# Patient Record
Sex: MALE | Race: ASIAN | NOT HISPANIC OR LATINO
[De-identification: names, ages, dates, MRNs, and addresses within clinical notes are randomized per-mention and may not be internally consistent; named-entity substitution may affect disease eponyms.]

---

## 2024-01-03 PROBLEM — Z00.00 ENCOUNTER FOR PREVENTIVE HEALTH EXAMINATION: Status: ACTIVE | Noted: 2024-01-03

## 2024-01-04 ENCOUNTER — APPOINTMENT (OUTPATIENT)
Dept: THORACIC SURGERY | Facility: CLINIC | Age: 74
End: 2024-01-04
Payer: MEDICAID

## 2024-01-04 VITALS
HEART RATE: 72 BPM | RESPIRATION RATE: 17 BRPM | TEMPERATURE: 96.8 F | OXYGEN SATURATION: 96 % | WEIGHT: 160 LBS | BODY MASS INDEX: 25.11 KG/M2 | HEIGHT: 67 IN | DIASTOLIC BLOOD PRESSURE: 82 MMHG | SYSTOLIC BLOOD PRESSURE: 126 MMHG

## 2024-01-04 DIAGNOSIS — Z87.891 PERSONAL HISTORY OF NICOTINE DEPENDENCE: ICD-10-CM

## 2024-01-04 DIAGNOSIS — Z87.09 PERSONAL HISTORY OF OTHER DISEASES OF THE RESPIRATORY SYSTEM: ICD-10-CM

## 2024-01-04 DIAGNOSIS — Z86.79 PERSONAL HISTORY OF OTHER DISEASES OF THE CIRCULATORY SYSTEM: ICD-10-CM

## 2024-01-04 PROCEDURE — 99205 OFFICE O/P NEW HI 60 MIN: CPT

## 2024-01-05 PROBLEM — Z87.09 HISTORY OF ASTHMA: Status: RESOLVED | Noted: 2024-01-05 | Resolved: 2024-01-05

## 2024-01-05 PROBLEM — Z86.79 HISTORY OF HYPERTENSION: Status: RESOLVED | Noted: 2024-01-05 | Resolved: 2024-01-05

## 2024-01-05 PROBLEM — Z87.891 FORMER SMOKER: Status: ACTIVE | Noted: 2024-01-05

## 2024-01-05 RX ORDER — AMLODIPINE BESYLATE 5 MG/1
5 TABLET ORAL
Refills: 0 | Status: ACTIVE | COMMUNITY

## 2024-01-05 RX ORDER — LOSARTAN POTASSIUM 100 MG/1
TABLET, FILM COATED ORAL
Refills: 0 | Status: ACTIVE | COMMUNITY

## 2024-01-05 NOTE — HISTORY OF PRESENT ILLNESS
[FreeTextEntry1] : Mr. NIRU ESQUIVEL, 74 year old male, former smoker, w/ hx of HTN, asthma, who presented with persistent cough and mucus, found to have lung nodule and liver lesion.   CT Chest on 12/18/23: - 2.2 cm irregular opacity in RLL (4:12) - possible 3.7 cm low-attention lesion in Lt lobe of liver (4:27) - this is sclerosis in Lt inferior pubic ramus   Pt presents today for CT Sx consultation, referred by Dr. Elaina Bond. Pt reports had hx of lung biopsy in [place] in 1990s, it was benign findings. Pt reports asymptomatic, denies fever, chills, SOB, cough, hemoptysis, CP, pain or recent illness.

## 2024-01-05 NOTE — ASSESSMENT
[FreeTextEntry1] : Mr. NIRU ESQUIVEL, 74 year old male, former smoker, w/ hx of HTN, asthma, who presented with persistent cough and mucus, found to have lung nodule and liver lesion.   CT Chest on 12/18/23: - 2.2 cm irregular opacity in RLL (4:12) - possible 3.7 cm low-attention lesion in Lt lobe of liver (4:27) - this is sclerosis in Lt inferior pubic ramus   I have reviewed the patient's medical records and diagnostic images at time of this office consultation and have made the following recommendation: 1. CT reviewed with pt, lung nodule with liver lesion, suspicious for metastatic disease, will order PET/CT for further evaluation. PCP ordered CT Chest and MRI Abd, pending to be done.  2. PFTs  3. RTC after all above studies to discuss next step.    I, LALA Eubanks, personally performed the evaluation and management (E/M) services for this new patient.  That E/M includes conducting the initial examination, assessing all conditions, and establishing the plan of care.  Today, my ACP, Marisol Arriaga, JORDY-BC was here to observe my evaluation and management services for this patient to be followed going forward.

## 2024-01-05 NOTE — CONSULT LETTER
[Dear  ___] : Dear  [unfilled], [Consult Letter:] : I had the pleasure of evaluating your patient, [unfilled]. [Please see my note below.] : Please see my note below. [Consult Closing:] : Thank you very much for allowing me to participate in the care of this patient.  If you have any questions, please do not hesitate to contact me. [Sincerely,] : Sincerely, [FreeTextEntry2] : Elaina Bond MD (ref/PCP) [FreeTextEntry3] : Ruslan Vicente MD, MPH  System Director of Thoracic Surgery  Director of Comprehensive Lung and Foregut [place]  Professor Cardiovascular & Thoracic Surgery   [employer] [employer] at [facility]

## 2024-01-11 ENCOUNTER — APPOINTMENT (OUTPATIENT)
Dept: THORACIC SURGERY | Facility: CLINIC | Age: 74
End: 2024-01-11
Payer: MEDICAID

## 2024-01-11 PROCEDURE — 99443: CPT

## 2024-01-12 NOTE — REASON FOR VISIT
[Home] : at home, [unfilled] , at the time of the visit. [Medical Office: ([facility])___] : at the medical office located in  [Other:____] : [unfilled] [This encounter was initiated by telehealth (audio with video) and converted to telephone (audio only) due to technical difficulties.] : This encounter was initiated by telehealth (audio with video) and converted to telephone (audio only) due to technical difficulties. [Follow-Up: _____] : a [unfilled] follow-up visit

## 2024-01-22 NOTE — HISTORY OF PRESENT ILLNESS
Venipuncture collected.     [FreeTextEntry1] : Mr. NIRU ESQUIVEL, 74 year old male, former smoker, w/ hx of HTN, asthma, who presented with persistent cough and mucus, found to have lung nodule and liver lesion.   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   Pt today to follow up with telehealth.

## 2024-01-22 NOTE — ADDENDUM
[FreeTextEntry1] : 1/22/24: Case discussed with Dr. Valencia, PET scan is helpful to assess liver mass, but patient would benefit with a CT Angio of the abdomen to assess the Lt hepatic artery/aneurysm. Will order CT Angio as discussed.  I, Dr. CISNEROS, LALA BARNETT, personally performed the evaluation and management (E/M) services for this established patient who presents today with (a) new problem(s)/exacerbation of (an) existing condition(s). That E/M includes conducting the examination, assessing all new/exacerbated conditions, and establishing a new plan of care. Today, my ACP, Servando Engel NP was here to observe my evaluation and management services for this new problem/exacerbated condition to be followed going forward.

## 2024-01-22 NOTE — CONSULT LETTER
[Dear  ___] : Dear  [unfilled], [Consult Letter:] : I had the pleasure of evaluating your patient, [unfilled]. [Please see my note below.] : Please see my note below. [Consult Closing:] : Thank you very much for allowing me to participate in the care of this patient.  If you have any questions, please do not hesitate to contact me. [Sincerely,] : Sincerely, [FreeTextEntry2] : Elaina Bond MD (ref/PCP) [FreeTextEntry3] : Ruslan Vicente MD, MPH  System Director of Thoracic Surgery  Director of Comprehensive Lung and Foregut [place]  Professor Cardiovascular & Thoracic Surgery   [facility] [employer] at [facility]

## 2024-01-22 NOTE — ASSESSMENT
[FreeTextEntry1] : Mr. NIRU ESQUIVEL, 74 year old male, former smoker, w/ hx of HTN, asthma, who presented with persistent cough and mucus, found to have lung nodule and liver lesion.   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   I have reviewed the patient's medical records and diagnostic images at time of this office consultation and have made the following recommendation: 1. MRI liver reviewed, suspicious for metastatic disease. Pending PET, I would like to refer pt to IR Dr. Valencia for biopsy of liver lesion.  2. RTC after biopsy   I, LALA Eubanks, personally performed the evaluation and management (E/M) services for this established patient who presents today with (a) new problem(s)/exacerbation of (an) existing condition(s).  That E/M includes conducting the examination, assessing all new/exacerbated conditions, and establishing a new plan of care.  Today, my ACP, JORDY Banerjee-BC was here to observe my evaluation and management services for this new problem/exacerbated condition to be followed going forward.

## 2024-01-29 ENCOUNTER — OUTPATIENT (OUTPATIENT)
Dept: OUTPATIENT SERVICES | Facility: HOSPITAL | Age: 74
LOS: 1 days | End: 2024-01-29
Payer: MEDICAID

## 2024-01-29 ENCOUNTER — APPOINTMENT (OUTPATIENT)
Dept: CT IMAGING | Facility: IMAGING CENTER | Age: 74
End: 2024-01-29
Payer: MEDICAID

## 2024-01-29 DIAGNOSIS — K76.9 LIVER DISEASE, UNSPECIFIED: ICD-10-CM

## 2024-01-29 PROCEDURE — 74175 CTA ABDOMEN W/CONTRAST: CPT

## 2024-01-29 PROCEDURE — 74175 CTA ABDOMEN W/CONTRAST: CPT | Mod: 26

## 2024-02-13 ENCOUNTER — APPOINTMENT (OUTPATIENT)
Dept: INTERVENTIONAL RADIOLOGY/VASCULAR | Facility: CLINIC | Age: 74
End: 2024-02-13

## 2024-02-13 VITALS
BODY MASS INDEX: 25.11 KG/M2 | SYSTOLIC BLOOD PRESSURE: 130 MMHG | RESPIRATION RATE: 17 BRPM | HEIGHT: 67 IN | HEART RATE: 71 BPM | DIASTOLIC BLOOD PRESSURE: 80 MMHG | OXYGEN SATURATION: 96 % | WEIGHT: 160 LBS

## 2024-02-13 PROCEDURE — XXXXX: CPT | Mod: 1L

## 2024-02-13 NOTE — PHYSICAL EXAM
[Alert] : alert [No Respiratory Distress] : no respiratory distress [Normal Rate] : heart rate was normal  [Oriented x3] : oriented to person, place, and time [No Accessory Muscle Use] : no accessory muscle use [Clear to Auscultation] : lungs were clear to auscultation bilaterally [Normal S1, S2] : normal S1 and S2 [Not Tender] : non-tender [Soft] : abdomen soft [Not Distended] : not distended [de-identified] : b/l femoral pulses palpable and b/l DP's palpable as well.

## 2024-02-13 NOTE — REVIEW OF SYSTEMS
[Nosebleeds] : nosebleeds [Fever] : no fever [Chills] : no chills [Chest Pain] : no chest pain [Palpitations] : no palpitations [Shortness Of Breath] : no shortness of breath [Diarrhea] : no diarrhea [Easy Bleeding] : no tendency for easy bleeding [Easy Bruising] : no tendency for easy bruising

## 2024-02-13 NOTE — REASON FOR VISIT
[Consultation] : a consultation visit [Other: _____] : [unfilled] [Other: ______] : provided by PAT [Time Spent: ____ minutes] : Total time spent using  services: [unfilled] minutes. The patient's primary language is not English thus required  services. [FreeTextEntry1] : Hepatic angiogram [Interpreters_IDNumber] : [id number] [Interpreters_FullName] : Eleanor [TWNoteComboBox1] : Chinese

## 2024-02-13 NOTE — ASSESSMENT
[FreeTextEntry1] : 74-year-old male with a past medical history of former smoker, HTN, asthma who presented to [facility] with persistent cough and mucus and was found to have lung nodule, liver lesion and hepatic aneurysm.  Original consultation for liver mass biopsy based on unenhanced imaging however, upon further review of MRI and CTA, would additionally want to proceed with hepatic artery angiogram and possible embolization. He has now been referred to IR by Dr. Vicente for consultation regarding a heaptic angiogram.   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 Early enhancement of the left portal vein, raising concern for an arterioportal shunt. Question communication between a left portal venous branch and the left hepatic artery aneurysm, which may represent arterioportal fistula. Diffuse prominence of the celiac artery and hepatic arteries."  The full procedure of a visceral and hepatic angiogram was discussed with the patient and his family.  Angiogram would be performed with intention to treat the hepatic artery aneurysm with embolization. Risks, benefits, and alternatives were discussed.  Risk discussed included, but were not limited to, risk of bleeding, vascular injury, hepatic dysfunction. Ample time was provided to answer his and his family's questions.  Consent was obtained at the time of consultation. Prior to proceeding, labs will be obtained including CBC, CMP, and coags.  Will plan to present case at multidisciplinary conference.  Procedure tentatively planned to be performed with sedation in IR.    Plan: Visceral and hepatic artery angiogram.  Possible hepatic artery aneurysm embolization.

## 2024-02-13 NOTE — PLAN
[TextEntry] : *Patient was given pre op instructions at today's visit. *No eating after midnight the night before. Patient can have water up until two hrs before scheduled procedure. *No OTC Aspirin five days before procedure, no Ibuprofen, Advil , Aleve, Motrin for 24 hrs prior to procedure. *Patient was instructed to take her meds two hrs prior to procedure with water. *You must make arrangements prior to procedure for a family member/friend to drive you home after your procedure. You cannot take taxicab, Uber or public transportation unless your friend, family member is riding along with you in the taxicab, uber or public transportation. *Please bring your ID and insurance card with you on the day of procedure. *Please leave all jewelry and valuables at home on the day of procedure.

## 2024-02-13 NOTE — DATA REVIEWED
[FreeTextEntry1] : Ct angio reviewed with patient at length. All questions answered during consultation.

## 2024-02-13 NOTE — HISTORY OF PRESENT ILLNESS
[FreeTextEntry1] : Patient is a 74-year-old male with a past medical history of former smoker, HTN, asthma who presented to [facility] with persistent cough and mucus and was found to have lung nodule, liver lesion and hepatic aneurysm. He has now been referred to IR by Dr. Vicente for consultation regarding a heaptic angiogram.   Patient denies recent fever, chills, shortness of breath, chest pain, nausea, vomiting or diarrhea.   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 Early enhancement of the left portal vein, raising concern for an arterioportal shunt. Question communication between a left portal venous branch and the left hepatic artery aneurysm, which may represent arterioportal fistula. Diffuse prominence of the celiac artery and hepatic arteries."  Of note nosebleed last night lasted few min and then stopped. Patient states he has nosebleed almost every day.

## 2024-02-14 LAB — POTASSIUM SERPL-SCNC: 4.5 MMOL/L

## 2024-02-22 ENCOUNTER — NON-APPOINTMENT (OUTPATIENT)
Age: 74
End: 2024-02-22

## 2024-02-23 ENCOUNTER — OUTPATIENT (OUTPATIENT)
Dept: OUTPATIENT SERVICES | Facility: HOSPITAL | Age: 74
LOS: 1 days | End: 2024-02-23
Payer: MEDICAID

## 2024-02-23 VITALS
HEART RATE: 59 BPM | OXYGEN SATURATION: 96 % | HEIGHT: 66 IN | RESPIRATION RATE: 14 BRPM | WEIGHT: 162.04 LBS | DIASTOLIC BLOOD PRESSURE: 79 MMHG | SYSTOLIC BLOOD PRESSURE: 152 MMHG | TEMPERATURE: 98 F

## 2024-02-23 DIAGNOSIS — K76.9 LIVER DISEASE, UNSPECIFIED: ICD-10-CM

## 2024-02-23 DIAGNOSIS — Z98.890 OTHER SPECIFIED POSTPROCEDURAL STATES: Chronic | ICD-10-CM

## 2024-02-23 DIAGNOSIS — I72.8 ANEURYSM OF OTHER SPECIFIED ARTERIES: ICD-10-CM

## 2024-02-23 LAB
APTT BLD: 38.4 SEC — HIGH (ref 24.5–35.6)
INR BLD: 1.04 RATIO — SIGNIFICANT CHANGE UP (ref 0.85–1.18)
PROTHROM AB SERPL-ACNC: 11.7 SEC — SIGNIFICANT CHANGE UP (ref 9.5–13)

## 2024-02-23 PROCEDURE — 93010 ELECTROCARDIOGRAM REPORT: CPT

## 2024-02-23 NOTE — H&P PST ADULT - OPHTHALMOLOGIC
SOC Note:    port managed per HD center. Pt has dialysis at [facility] on m,w,f    Home Health ordered for: disciplines SN, PT, OT, ST    Reason for Hosp/Primary Dx/Co-morbidities: Patient is a 68 yr old  male with history of type 2 diabetes, hypertension, hyperlipidemia, CAD status post stents, with severe triple-vessel CAD, s/p CABG 3/1/2023. Patient with acute on chronic renal failure, requiring HD. Patient also had odynophagia and was started on Diflucan.    Focus of Care: Disease process education CABG, assess and monitor skin integrity, medication management and falls prevention    Current Functional status/mobility/DME: none    HB status/Living Arrangements: lives with wife 24/7    Skin Integrity/wound status: incisions to chest, left/right legs     Code Status: CPR    Fall Risk: YES    POC confirmed with Patient and caregiver on 3,21.23    Plan for next visit: disease proess eduation, assess and monitor skin integrity negative

## 2024-02-23 NOTE — H&P PST ADULT - PROBLEM SELECTOR PLAN 1
Pt scheduled for hepatic angiogram, emobolization on 2/29/2024.  labs cbc, cmp done in IR results in chart, Silvana MCGREGOR for IR requesting to repeat pt/ptt- results pending.  Preop teaching done, pt able to verbalize understanding.   medication day of procedure-  esomeprazole, Amolodipine  anesthesia-  JOSE precautions

## 2024-02-23 NOTE — H&P PST ADULT - HISTORY OF PRESENT ILLNESS
75y/o male scheduled for hepatic angiogram, embolization on 2/29/2024 73y/o male scheduled for hepatic angiogram, embolization on 2/29/2024.  Pt has hx of chronic cough with white phlegm for the past 2 yrs, was evaluated by thoracic surgery, was found to have right lung nodule and liver lesion. MRI of liver large left hepatic artery aneurysm corresponding mass, multiple hepatic sold masses suspicious fo metastases, few hepatic subcentimeter cysts.

## 2024-02-23 NOTE — H&P PST ADULT - GASTROINTESTINAL
negative normal/soft/nontender/nondistended/normal active bowel sounds/no guarding/no rigidity/no organomegaly/no palpable alvina

## 2024-02-23 NOTE — H&P PST ADULT - NSICDXPASTMEDICALHX_GEN_ALL_CORE_FT
PAST MEDICAL HISTORY:  Asthma     GERD (gastroesophageal reflux disease)     Hepatic artery aneurysm     Hypertension     Liver mass     Lung nodules

## 2024-02-29 ENCOUNTER — RESULT REVIEW (OUTPATIENT)
Age: 74
End: 2024-02-29

## 2024-02-29 ENCOUNTER — INPATIENT (INPATIENT)
Facility: HOSPITAL | Age: 74
LOS: 0 days | Discharge: HOME | End: 2024-03-01
Attending: INTERNAL MEDICINE | Admitting: INTERNAL MEDICINE
Payer: MEDICAID

## 2024-02-29 VITALS
HEIGHT: 66 IN | TEMPERATURE: 98 F | OXYGEN SATURATION: 98 % | RESPIRATION RATE: 18 BRPM | HEART RATE: 63 BPM | WEIGHT: 157.19 LBS | DIASTOLIC BLOOD PRESSURE: 71 MMHG | SYSTOLIC BLOOD PRESSURE: 115 MMHG

## 2024-02-29 DIAGNOSIS — K76.9 LIVER DISEASE, UNSPECIFIED: ICD-10-CM

## 2024-02-29 DIAGNOSIS — I72.8 ANEURYSM OF OTHER SPECIFIED ARTERIES: ICD-10-CM

## 2024-02-29 DIAGNOSIS — Z98.890 OTHER SPECIFIED POSTPROCEDURAL STATES: Chronic | ICD-10-CM

## 2024-02-29 DIAGNOSIS — Z29.9 ENCOUNTER FOR PROPHYLACTIC MEASURES, UNSPECIFIED: ICD-10-CM

## 2024-02-29 DIAGNOSIS — I10 ESSENTIAL (PRIMARY) HYPERTENSION: ICD-10-CM

## 2024-02-29 DIAGNOSIS — I78.0 HEREDITARY HEMORRHAGIC TELANGIECTASIA: ICD-10-CM

## 2024-02-29 DIAGNOSIS — R16.0 HEPATOMEGALY, NOT ELSEWHERE CLASSIFIED: ICD-10-CM

## 2024-02-29 DIAGNOSIS — K21.9 GASTRO-ESOPHAGEAL REFLUX DISEASE WITHOUT ESOPHAGITIS: ICD-10-CM

## 2024-02-29 LAB
ALBUMIN SERPL ELPH-MCNC: 3.8 G/DL — SIGNIFICANT CHANGE UP (ref 3.3–5)
ALP SERPL-CCNC: 107 U/L — SIGNIFICANT CHANGE UP (ref 40–120)
ALT FLD-CCNC: 13 U/L — SIGNIFICANT CHANGE UP (ref 4–41)
ANION GAP SERPL CALC-SCNC: 9 MMOL/L — SIGNIFICANT CHANGE UP (ref 7–14)
AST SERPL-CCNC: 19 U/L — SIGNIFICANT CHANGE UP (ref 4–40)
BASE EXCESS BLDV CALC-SCNC: 1.6 MMOL/L — SIGNIFICANT CHANGE UP (ref -2–3)
BASOPHILS # BLD AUTO: 0.05 K/UL — SIGNIFICANT CHANGE UP (ref 0–0.2)
BASOPHILS NFR BLD AUTO: 0.7 % — SIGNIFICANT CHANGE UP (ref 0–2)
BILIRUB SERPL-MCNC: 0.6 MG/DL — SIGNIFICANT CHANGE UP (ref 0.2–1.2)
BUN SERPL-MCNC: 18 MG/DL — SIGNIFICANT CHANGE UP (ref 7–23)
CA-I SERPL-SCNC: 1.14 MMOL/L — LOW (ref 1.15–1.33)
CALCIUM SERPL-MCNC: 9 MG/DL — SIGNIFICANT CHANGE UP (ref 8.4–10.5)
CHLORIDE BLDV-SCNC: 106 MMOL/L — SIGNIFICANT CHANGE UP (ref 96–108)
CHLORIDE SERPL-SCNC: 107 MMOL/L — SIGNIFICANT CHANGE UP (ref 98–107)
CO2 BLDV-SCNC: 27.1 MMOL/L — HIGH (ref 22–26)
CO2 SERPL-SCNC: 24 MMOL/L — SIGNIFICANT CHANGE UP (ref 22–31)
CREAT SERPL-MCNC: 0.96 MG/DL — SIGNIFICANT CHANGE UP (ref 0.5–1.3)
EGFR: 83 ML/MIN/1.73M2 — SIGNIFICANT CHANGE UP
EOSINOPHIL # BLD AUTO: 0.01 K/UL — SIGNIFICANT CHANGE UP (ref 0–0.5)
EOSINOPHIL NFR BLD AUTO: 0.1 % — SIGNIFICANT CHANGE UP (ref 0–6)
GAS PNL BLDV: 132 MMOL/L — LOW (ref 136–145)
GAS PNL BLDV: SIGNIFICANT CHANGE UP
GLUCOSE BLDV-MCNC: 109 MG/DL — HIGH (ref 70–99)
GLUCOSE SERPL-MCNC: 107 MG/DL — HIGH (ref 70–99)
HCO3 BLDV-SCNC: 26 MMOL/L — SIGNIFICANT CHANGE UP (ref 22–29)
HCT VFR BLD CALC: 41.3 % — SIGNIFICANT CHANGE UP (ref 39–50)
HCT VFR BLDA CALC: 43 % — SIGNIFICANT CHANGE UP (ref 39–51)
HGB BLD CALC-MCNC: 14.3 G/DL — SIGNIFICANT CHANGE UP (ref 12.6–17.4)
HGB BLD-MCNC: 13.7 G/DL — SIGNIFICANT CHANGE UP (ref 13–17)
IANC: 5.71 K/UL — SIGNIFICANT CHANGE UP (ref 1.8–7.4)
IMM GRANULOCYTES NFR BLD AUTO: 0.3 % — SIGNIFICANT CHANGE UP (ref 0–0.9)
LACTATE BLDV-MCNC: 1.6 MMOL/L — SIGNIFICANT CHANGE UP (ref 0.5–2)
LYMPHOCYTES # BLD AUTO: 1.13 K/UL — SIGNIFICANT CHANGE UP (ref 1–3.3)
LYMPHOCYTES # BLD AUTO: 15.5 % — SIGNIFICANT CHANGE UP (ref 13–44)
MAGNESIUM SERPL-MCNC: 2 MG/DL — SIGNIFICANT CHANGE UP (ref 1.6–2.6)
MCHC RBC-ENTMCNC: 29.8 PG — SIGNIFICANT CHANGE UP (ref 27–34)
MCHC RBC-ENTMCNC: 33.2 GM/DL — SIGNIFICANT CHANGE UP (ref 32–36)
MCV RBC AUTO: 90 FL — SIGNIFICANT CHANGE UP (ref 80–100)
MONOCYTES # BLD AUTO: 0.36 K/UL — SIGNIFICANT CHANGE UP (ref 0–0.9)
MONOCYTES NFR BLD AUTO: 4.9 % — SIGNIFICANT CHANGE UP (ref 2–14)
NEUTROPHILS # BLD AUTO: 5.71 K/UL — SIGNIFICANT CHANGE UP (ref 1.8–7.4)
NEUTROPHILS NFR BLD AUTO: 78.5 % — HIGH (ref 43–77)
NRBC # BLD: 0 /100 WBCS — SIGNIFICANT CHANGE UP (ref 0–0)
NRBC # FLD: 0 K/UL — SIGNIFICANT CHANGE UP (ref 0–0)
PCO2 BLDV: 39 MMHG — LOW (ref 42–55)
PH BLDV: 7.43 — SIGNIFICANT CHANGE UP (ref 7.32–7.43)
PHOSPHATE SERPL-MCNC: 3.2 MG/DL — SIGNIFICANT CHANGE UP (ref 2.5–4.5)
PLATELET # BLD AUTO: 157 K/UL — SIGNIFICANT CHANGE UP (ref 150–400)
PO2 BLDV: 49 MMHG — HIGH (ref 25–45)
POTASSIUM BLDV-SCNC: 4 MMOL/L — SIGNIFICANT CHANGE UP (ref 3.5–5.1)
POTASSIUM SERPL-MCNC: 4.2 MMOL/L — SIGNIFICANT CHANGE UP (ref 3.5–5.3)
POTASSIUM SERPL-SCNC: 4.2 MMOL/L — SIGNIFICANT CHANGE UP (ref 3.5–5.3)
PROT SERPL-MCNC: 7 G/DL — SIGNIFICANT CHANGE UP (ref 6–8.3)
RBC # BLD: 4.59 M/UL — SIGNIFICANT CHANGE UP (ref 4.2–5.8)
RBC # FLD: 14 % — SIGNIFICANT CHANGE UP (ref 10.3–14.5)
SAO2 % BLDV: 82.7 % — SIGNIFICANT CHANGE UP (ref 67–88)
SODIUM SERPL-SCNC: 140 MMOL/L — SIGNIFICANT CHANGE UP (ref 135–145)
WBC # BLD: 7.28 K/UL — SIGNIFICANT CHANGE UP (ref 3.8–10.5)
WBC # FLD AUTO: 7.28 K/UL — SIGNIFICANT CHANGE UP (ref 3.8–10.5)

## 2024-02-29 PROCEDURE — 37242 VASC EMBOLIZE/OCCLUDE ARTERY: CPT | Mod: 53

## 2024-02-29 PROCEDURE — 99223 1ST HOSP IP/OBS HIGH 75: CPT

## 2024-02-29 PROCEDURE — 36247 INS CATH ABD/L-EXT ART 3RD: CPT

## 2024-02-29 PROCEDURE — 76937 US GUIDE VASCULAR ACCESS: CPT | Mod: 26

## 2024-02-29 PROCEDURE — 36245 INS CATH ABD/L-EXT ART 1ST: CPT | Mod: 59

## 2024-02-29 RX ORDER — ESOMEPRAZOLE MAGNESIUM 40 MG/1
1 CAPSULE, DELAYED RELEASE ORAL
Refills: 0 | DISCHARGE

## 2024-02-29 RX ORDER — ONDANSETRON 8 MG/1
4 TABLET, FILM COATED ORAL ONCE
Refills: 0 | Status: DISCONTINUED | OUTPATIENT
Start: 2024-02-29 | End: 2024-03-01

## 2024-02-29 RX ORDER — FENTANYL CITRATE 50 UG/ML
50 INJECTION INTRAVENOUS
Refills: 0 | Status: DISCONTINUED | OUTPATIENT
Start: 2024-02-29 | End: 2024-03-01

## 2024-02-29 RX ORDER — ACETAMINOPHEN 500 MG
650 TABLET ORAL EVERY 6 HOURS
Refills: 0 | Status: DISCONTINUED | OUTPATIENT
Start: 2024-02-29 | End: 2024-03-01

## 2024-02-29 RX ORDER — LANOLIN ALCOHOL/MO/W.PET/CERES
3 CREAM (GRAM) TOPICAL AT BEDTIME
Refills: 0 | Status: DISCONTINUED | OUTPATIENT
Start: 2024-02-29 | End: 2024-03-01

## 2024-02-29 RX ORDER — FENTANYL CITRATE 50 UG/ML
25 INJECTION INTRAVENOUS
Refills: 0 | Status: DISCONTINUED | OUTPATIENT
Start: 2024-02-29 | End: 2024-03-01

## 2024-02-29 NOTE — H&P ADULT - PROBLEM SELECTOR PLAN 2
as per chart review, patient followed up with Oncology in [place] in 1/2024 for lung mass and possible liver lesion. 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 He had  liver  finding in [place] in 1993 which turned out to be benign. Pt complained of cough and 9lb weight loss.  Patient referred to thoracic surgeon for pulmonary opacity suspicious for bronchiogenic carcinoma, PET scan ordered by oncology.     Pt will need to follow up with Dr.Paul Vicente, thoracic surgeon and oncology outpt   Follow up outpt with Hepatology

## 2024-02-29 NOTE — H&P ADULT - PROBLEM SELECTOR PLAN 6
DVT ppx: SCDs   pt ambulates independently     Dispo: likely discharge in am    Daughter- Yun [phone]

## 2024-02-29 NOTE — H&P ADULT - ASSESSMENT
74yMale PMHx HTN, GERD, Asthma, Osler­Crane­Rendu syndrome. Liver mass, hepatic artery aneurysm presented for hepatic angiogram. He underwent hepatic angiogram and embolization of L hepatic aneurysm and admitted for further monitoring.      Of note, pt has hx of chronic cough with white phlegm for the past 2 yrs, was evaluated by thoracic surgery, and found to have right lung nodule and liver lesion. MRI of liver large left hepatic artery aneurysm corresponding mass, multiple hepatic mass  suspicious for metastases.   as per chart review, patient followed up with Oncology in [place] in 1/2024 for lung mass and possible liver lesion. 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 He had  liver  finding in [place] in 1993 which turned out to be benign. Pt complained of cough and 9lb weight loss.  Patient referred to thoracic surgeon for pulmonary opacity suspicious for bronchiogenic carcinoma, PET scan ordered by oncology.

## 2024-02-29 NOTE — PRE PROCEDURE NOTE - PRE PROCEDURE EVALUATION
Interventional Radiology Pre-Procedure Note    This is a 74y Male with history of HTN, asthma, former smoker, lung nodule, found to have left hepatic artery 4.6cm aneurysm. Patient presents for hepatic angiogram and possible embolization.    PAST MEDICAL & SURGICAL HISTORY:  Hypertension    GERD (gastroesophageal reflux disease)    Lung nodules    Hepatic artery aneurysm    Liver mass    Asthma    History of surgery on lower extremity    Allergies:   No Known Allergies  Informed consent obtained. All questions and concerns have been addressed at this time.

## 2024-02-29 NOTE — H&P ADULT - PROBLEM SELECTOR PLAN 1
MRI of liver showed  large left hepatic artery aneurysm  s/p hepatic angiogram and embolization of L hepatic aneurysm on 2/29    admit to medicine for monitoring   Monitor VS q4h   IR following   as per IR, will need to follow up with vascular outpt. will need workup outpatient vasculitis as he has another aneurysm  SCDs   Follow up labs

## 2024-02-29 NOTE — H&P ADULT - PROBLEM SELECTOR PLAN 3
on imaging- pt had multiple purplish lesions consistent with Osler-Weber-Rendu syndrome as per Heme/Onc notes    Follow up outpt with hematology after discharge  Follow up CBC

## 2024-02-29 NOTE — H&P ADULT - HISTORY OF PRESENT ILLNESS
HPI:    74yMale PMHx HTN, GERD, Asthma, Liver mass, hepatic artery aneurysm presented for hepatic angiogram. He underwent hepatic angiogram and embolization of L hepatic aneurysm and admitted for further monitoring.   Patient states he feels sore, has dry mouth after the procedure but otherwise has no complaints at this time. Daughters at bedside and provided information about medications and history. Patient is AOx3.   Of note, pt has hx of chronic cough with white phlegm for the past 2 yrs, was evaluated by thoracic surgery, and found to have right lung nodule and liver lesion. MRI of liver large left hepatic artery aneurysm corresponding mass, multiple hepatic mass  suspicious for metastases.     Denies fever, chills, myalgia, N/V, abdominal pain, diarrhea/constipation, numbness/tingling    PAST MEDICAL & SURGICAL HISTORY:  Hypertension      GERD (gastroesophageal reflux disease)      Lung nodules      Hepatic artery aneurysm      Liver mass      Asthma      History of surgery on lower extremity          Review of Systems:   CONSTITUTIONAL: No fever, weight loss, or fatigue  EYES: No eye pain, visual disturbances, or discharge  ENMT:  No difficulty hearing, tinnitus, vertigo; No sinus or throat pain  RESPIRATORY: No cough, wheezing, chills or hemoptysis; No shortness of breath  CARDIOVASCULAR: No chest pain, palpitations, dizziness, or leg swelling  GASTROINTESTINAL: No abdominal or epigastric pain. No nausea, vomiting, or hematemesis; No diarrhea. + constipation. No melena or hematochezia.  NEUROLOGICAL: No headaches, memory loss, loss of strength, numbness, or tremors  SKIN: No itching, burning, rashes, or lesions   MUSCULOSKELETAL: No joint pain or swelling; No muscle, back, or extremity pain  PSYCHIATRIC: No depression, anxiety, mood swings, or difficulty sleeping      Allergies    No Known Allergies    Intolerances    Social History:   Former smoker, used to smoke 1ppd for 20+ years, quit few months ago. Occasional alcohol in the past. no illicit drug use. Lives at home with wife. Has two daughters.     FAMILY HISTORY:      MEDICATIONS  (STANDING):    MEDICATIONS  (PRN):  acetaminophen     Tablet .. 650 milliGRAM(s) Oral every 6 hours PRN Temp greater or equal to 38C (100.4F), Mild Pain (1 - 3)  fentaNYL    Injectable 50 MICROGram(s) IV Push every 15 minutes PRN Severe Pain (7 - 10)  fentaNYL    Injectable 25 MICROGram(s) IV Push every 5 minutes PRN Moderate Pain (4 - 6)  melatonin 3 milliGRAM(s) Oral at bedtime PRN Insomnia  ondansetron Injectable 4 milliGRAM(s) IV Push once PRN Nausea and/or Vomiting      T(C): --  HR: --  BP: --  RR: --  SpO2: --    Weight (kg): 72 (02-29-24 @ 13:03)  CAPILLARY BLOOD GLUCOSE        I&O's Summary      PHYSICAL EXAM:  GENERAL: NAD, pt resting in bed comfortably  HEAD:  Atraumatic, Normocephalic  EYES: EOMI, PERRLA, conjunctiva and sclera clear  NECK: Supple   CHEST/LUNG: Clear to auscultation bilaterally; No wheeze  HEART: Regular rate and rhythm; No murmurs, rubs, or gallops  ABDOMEN: Soft, Nontender, Nondistended; Bowel sounds present  EXTREMITIES:  2+ Peripheral Pulses, No clubbing, cyanosis, or edema. R femoral access for angio and dressing in place, nontender.   PSYCH: AAOx3  NEUROLOGY: no focal deficits grossly noted.  moving all extremities  SKIN: No rashes or lesions    LABS:                      RADIOLOGY & ADDITIONAL TESTS:    ECG Personally Reviewed - sinus bradycardia     Imaging Personally Reviewed:    Consultant(s) Notes Reviewed:      Care Discussed with Consultants/Other Providers:

## 2024-02-29 NOTE — PHARMACOTHERAPY INTERVENTION NOTE - COMMENTS
Medication list updated in Outpatient Medication Record ([facility]). Source: [facility]    As per pharmacy, patient also has recent fills for:  -Zolpidem 10mg at bedtime as needed  -Esomeprazole 40 mg once a day

## 2024-02-29 NOTE — H&P ADULT - PROBLEM SELECTOR PLAN 4
Hold home medication: amlodipine and Losartan since BP soft at this time   Monitor BP and adjust medications accordingly   DASH diet

## 2024-02-29 NOTE — PROCEDURE NOTE - PROCEDURE FINDINGS AND DETAILS
Celiac and superior mesenteric artery angiography performed. Left hepatic artery angiography performed demonstrating large aneurysm. The aneurysm was successfully accessed and crossed. Coil embolization of the aneurysm outflow artery was performed, with additional coil and Lava embolization of the aneurysm sac and inflow artery. 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 Right common femoral artery hemostasis achieved using Mynx closure device.

## 2024-02-29 NOTE — PATIENT PROFILE ADULT - FALL HARM RISK - HARM RISK INTERVENTIONS

## 2024-03-01 ENCOUNTER — TRANSCRIPTION ENCOUNTER (OUTPATIENT)
Age: 74
End: 2024-03-01

## 2024-03-01 VITALS
HEART RATE: 58 BPM | SYSTOLIC BLOOD PRESSURE: 121 MMHG | RESPIRATION RATE: 18 BRPM | TEMPERATURE: 98 F | OXYGEN SATURATION: 97 % | DIASTOLIC BLOOD PRESSURE: 66 MMHG

## 2024-03-01 LAB
ALBUMIN SERPL ELPH-MCNC: 3.7 G/DL — SIGNIFICANT CHANGE UP (ref 3.3–5)
ALP SERPL-CCNC: 101 U/L — SIGNIFICANT CHANGE UP (ref 40–120)
ALT FLD-CCNC: 18 U/L — SIGNIFICANT CHANGE UP (ref 4–41)
ANION GAP SERPL CALC-SCNC: 10 MMOL/L — SIGNIFICANT CHANGE UP (ref 7–14)
APTT BLD: 37.2 SEC — HIGH (ref 24.5–35.6)
AST SERPL-CCNC: 23 U/L — SIGNIFICANT CHANGE UP (ref 4–40)
BASOPHILS # BLD AUTO: 0.04 K/UL — SIGNIFICANT CHANGE UP (ref 0–0.2)
BASOPHILS NFR BLD AUTO: 0.7 % — SIGNIFICANT CHANGE UP (ref 0–2)
BILIRUB SERPL-MCNC: 0.6 MG/DL — SIGNIFICANT CHANGE UP (ref 0.2–1.2)
BUN SERPL-MCNC: 19 MG/DL — SIGNIFICANT CHANGE UP (ref 7–23)
CALCIUM SERPL-MCNC: 9.1 MG/DL — SIGNIFICANT CHANGE UP (ref 8.4–10.5)
CHLORIDE SERPL-SCNC: 106 MMOL/L — SIGNIFICANT CHANGE UP (ref 98–107)
CO2 SERPL-SCNC: 24 MMOL/L — SIGNIFICANT CHANGE UP (ref 22–31)
CREAT SERPL-MCNC: 1 MG/DL — SIGNIFICANT CHANGE UP (ref 0.5–1.3)
EGFR: 79 ML/MIN/1.73M2 — SIGNIFICANT CHANGE UP
EOSINOPHIL # BLD AUTO: 0.07 K/UL — SIGNIFICANT CHANGE UP (ref 0–0.5)
EOSINOPHIL NFR BLD AUTO: 1.2 % — SIGNIFICANT CHANGE UP (ref 0–6)
GLUCOSE SERPL-MCNC: 88 MG/DL — SIGNIFICANT CHANGE UP (ref 70–99)
HCT VFR BLD CALC: 40.6 % — SIGNIFICANT CHANGE UP (ref 39–50)
HCV AB S/CO SERPL IA: 0.11 S/CO — SIGNIFICANT CHANGE UP (ref 0–0.99)
HCV AB SERPL-IMP: SIGNIFICANT CHANGE UP
HGB BLD-MCNC: 13.7 G/DL — SIGNIFICANT CHANGE UP (ref 13–17)
IANC: 3.87 K/UL — SIGNIFICANT CHANGE UP (ref 1.8–7.4)
IMM GRANULOCYTES NFR BLD AUTO: 0.2 % — SIGNIFICANT CHANGE UP (ref 0–0.9)
INR BLD: 1.15 RATIO — SIGNIFICANT CHANGE UP (ref 0.85–1.18)
LYMPHOCYTES # BLD AUTO: 1.41 K/UL — SIGNIFICANT CHANGE UP (ref 1–3.3)
LYMPHOCYTES # BLD AUTO: 23.8 % — SIGNIFICANT CHANGE UP (ref 13–44)
MCHC RBC-ENTMCNC: 30.7 PG — SIGNIFICANT CHANGE UP (ref 27–34)
MCHC RBC-ENTMCNC: 33.7 GM/DL — SIGNIFICANT CHANGE UP (ref 32–36)
MCV RBC AUTO: 91 FL — SIGNIFICANT CHANGE UP (ref 80–100)
MONOCYTES # BLD AUTO: 0.52 K/UL — SIGNIFICANT CHANGE UP (ref 0–0.9)
MONOCYTES NFR BLD AUTO: 8.8 % — SIGNIFICANT CHANGE UP (ref 2–14)
NEUTROPHILS # BLD AUTO: 3.87 K/UL — SIGNIFICANT CHANGE UP (ref 1.8–7.4)
NEUTROPHILS NFR BLD AUTO: 65.3 % — SIGNIFICANT CHANGE UP (ref 43–77)
NRBC # BLD: 0 /100 WBCS — SIGNIFICANT CHANGE UP (ref 0–0)
NRBC # FLD: 0 K/UL — SIGNIFICANT CHANGE UP (ref 0–0)
PLATELET # BLD AUTO: 156 K/UL — SIGNIFICANT CHANGE UP (ref 150–400)
POTASSIUM SERPL-MCNC: 4 MMOL/L — SIGNIFICANT CHANGE UP (ref 3.5–5.3)
POTASSIUM SERPL-SCNC: 4 MMOL/L — SIGNIFICANT CHANGE UP (ref 3.5–5.3)
PROT SERPL-MCNC: 7 G/DL — SIGNIFICANT CHANGE UP (ref 6–8.3)
PROTHROM AB SERPL-ACNC: 12.8 SEC — SIGNIFICANT CHANGE UP (ref 9.5–13)
RBC # BLD: 4.46 M/UL — SIGNIFICANT CHANGE UP (ref 4.2–5.8)
RBC # FLD: 13.9 % — SIGNIFICANT CHANGE UP (ref 10.3–14.5)
SODIUM SERPL-SCNC: 140 MMOL/L — SIGNIFICANT CHANGE UP (ref 135–145)
WBC # BLD: 5.92 K/UL — SIGNIFICANT CHANGE UP (ref 3.8–10.5)
WBC # FLD AUTO: 5.92 K/UL — SIGNIFICANT CHANGE UP (ref 3.8–10.5)

## 2024-03-01 PROCEDURE — 99239 HOSP IP/OBS DSCHRG MGMT >30: CPT

## 2024-03-01 RX ORDER — OXYCODONE HYDROCHLORIDE 5 MG/1
1 TABLET ORAL
Qty: 6 | Refills: 0
Start: 2024-03-01 | End: 2024-03-03

## 2024-03-01 RX ORDER — ACETAMINOPHEN 500 MG
2 TABLET ORAL
Qty: 0 | Refills: 0 | DISCHARGE
Start: 2024-03-01

## 2024-03-01 NOTE — DISCHARGE NOTE PROVIDER - NSDCFUADDAPPT_GEN_ALL_CORE_FT
follow up with interventional radiology and vascular   - you need MRI/MRA post embolization to check out your liver  - also need MRI/MRA brain/neck as outpatient

## 2024-03-01 NOTE — DISCHARGE NOTE PROVIDER - CARE PROVIDER_API CALL
Interventional Radiology,   [phone]  call in 1 week  Phone: (   )    -  Fax: (   )    -  Follow Up Time: 1-3 days    pcp,   Phone: (   )    -  Fax: (   )    -  Follow Up Time: 1-3 days

## 2024-03-01 NOTE — PROGRESS NOTE ADULT - SUBJECTIVE AND OBJECTIVE BOX
ANESTHESIA POSTOP CHECK    74y Male POSTOP DAY 1     No COMPLAINTS    NO APPARENT ANESTHESIA COMPLICATIONS      
Interventional Radiology Follow- Up Note      74y Male s/p left hepatic artery aneurysm embolization on 3/1/24 in Interventional Radiology.  Patient seen and examined @ bedside. Visit assisted by Mandarin  services. Patient reports mild abdominal pain/fullness. Reports tolerating oral liquid without difficulty, however no real appetite Denies nausea/vomiting. Mild back pain  No other complaints offered.    Vitals: T(F): 98 (03-01-24 @ 04:03), Max: 98.3 (02-29-24 @ 21:46)  HR: 65 (03-01-24 @ 04:03) (63 - 65)  BP: 135/79 (03-01-24 @ 04:03) (101/54 - 135/79)  RR: 16 (03-01-24 @ 04:03) (16 - 18)  SpO2: 98% (03-01-24 @ 04:03) (96% - 98%)  Wt(kg): --    LABS:                        13.7   5.92  )-----------( 156      ( 01 Mar 2024 05:30 )             40.6     03-01    140  |  106  |  19  ----------------------------<  88  4.0   |  24  |  1.00    Ca    9.1      01 Mar 2024 05:30  Phos  3.2     02-29  Mg     2.00     02-29    TPro  7.0  /  Alb  3.7  /  TBili  0.6  /  DBili  x   /  AST  23  /  ALT  18  /  AlkPhos  101  03-01    PT/INR - ( 01 Mar 2024 05:30 )   PT: 12.8 sec;   INR: 1.15 ratio         PTT - ( 01 Mar 2024 05:30 )  PTT:37.2 sec  I&O's Detail    29 Feb 2024 07:01  -  01 Mar 2024 07:00  --------------------------------------------------------  IN:  Total IN: 0 mL    OUT:    Voided (mL): 100 mL  Total OUT: 100 mL    Total NET: -100 mL            PHYSICAL EXAM:  General: Nontoxic, in NAD  Neuro:  Alert & oriented x 3Abdomen: soft, NTND. Normactive BS  Extremities: no pedal edema or calf tenderness noted. Right groin soft. Right pedal pulses intact.        Impression: 74y Male admitted with Disorder of liver      PMH Hypertension    GERD (gastroesophageal reflux disease)    Lung nodules    Hepatic artery aneurysm    Liver mass    Asthma        Plan:  -Encourage PO and ambulation  -OK to discharge later today if tolerating PO  -Patient will need follow-up with IR - post procedural imaging as outpatient to assess hepatic artery aneurysm s/p embolization. Ideally would obtain MRI/MRA  -Patient also noted to have small distal GDA aneurysm. Not treated at time of embolization but can be done in future. Additional imaging work up recommended to include CNS assessment. Discussed case with Dr Vicente. Would recommend MRI/MRA Brain/Head neck.     Please call IR at extension [phone] with any questions, concerns, or issues regarding above.

## 2024-03-01 NOTE — DISCHARGE NOTE PROVIDER - NSDCMRMEDTOKEN_GEN_ALL_CORE_FT
amLODIPine 5 mg oral tablet: 1 tab(s) orally once a day  losartan 50 mg oral tablet: 1 tab(s) orally once a day (at bedtime)   acetaminophen 325 mg oral tablet: 2 tab(s) orally every 6 hours As needed Temp greater or equal to 38C (100.4F), Mild Pain (1 - 3)  amLODIPine 5 mg oral tablet: 1 tab(s) orally once a day  losartan 50 mg oral tablet: 1 tab(s) orally once a day (at bedtime)   acetaminophen 325 mg oral tablet: 2 tab(s) orally every 6 hours As needed Temp greater or equal to 38C (100.4F), Mild Pain (1 - 3)  amLODIPine 5 mg oral tablet: 1 tab(s) orally once a day  losartan 50 mg oral tablet: 1 tab(s) orally once a day (at bedtime)  oxyCODONE 5 mg oral tablet: 1 tab(s) orally 2 times a day as needed for  severe pain MDD: 2 tablets

## 2024-03-01 NOTE — CHART NOTE - NSCHARTNOTEFT_GEN_A_CORE
pt seen and examined. vitals, labs, IR consult note reviewed.     pt is stable for discharge home today. discharge plan discussed with pt and his son-in-law at bedside in their native language- Mandarin, who are in agreement     total time spent on dc 37min

## 2024-03-01 NOTE — DISCHARGE NOTE PROVIDER - PROVIDER TOKENS
FREE:[LAST:[Interventional Radiology],PHONE:[(   )    -],FAX:[(   )    -],ADDRESS:[[phone]  call in 1 week],FOLLOWUP:[1-3 days]],FREE:[LAST:[pcp],PHONE:[(   )    -],FAX:[(   )    -],FOLLOWUP:[1-3 days]]

## 2024-03-01 NOTE — DISCHARGE NOTE NURSING/CASE MANAGEMENT/SOCIAL WORK - PATIENT PORTAL LINK FT
You can access the FollowMyHealth Patient Portal offered by [facility] by registering at the following website: [email or web address]. By joining [employer]’s FollowMyHealth portal, you will also be able to view your health information using other applications (apps) compatible with our system.

## 2024-03-01 NOTE — DISCHARGE NOTE PROVIDER - HOSPITAL COURSE
74yMale PMHx HTN, GERD, Asthma, Liver mass, hepatic artery aneurysm presented for hepatic angiogram. He underwent hepatic angiogram and embolization of L hepatic aneurysm. He was monitored closely overnight, no immediate complication of the procedure .  Pt is discharged home in stable condition.  pt instructed with follow with IR for post-procedure imaging and vascular/thoracic surgery/oncology for CNS vascular imaging

## 2024-03-01 NOTE — DISCHARGE NOTE PROVIDER - NSFOLLOWUPCLINICS_GEN_ALL_ED_FT
[facility]  [address] [place] [address]  Phone: [phone]  Fax: [phone]  Follow Up Time: 1 week    [facility]  Vascular Surgery  [address] [place] [address]  Phone: [phone]  Fax:   Follow Up Time: 1 week

## 2024-03-01 NOTE — PROGRESS NOTE ADULT - REASON FOR ADMISSION
Monitoring after hepatic angiogram and embolization of L hepatic aneurysm
Monitoring after hepatic angiogram and embolization of L hepatic aneurysm

## 2024-03-01 NOTE — DISCHARGE NOTE PROVIDER - NSDCCPCAREPLAN_GEN_ALL_CORE_FT
PRINCIPAL DISCHARGE DIAGNOSIS  Diagnosis: Hepatic artery aneurysm  Assessment and Plan of Treatment: you had emoblization of L hepatic artery aneurysm by IR (Dr. Gilbert). follow with IR outpt for post-procedure imaging. Follow with your thoracic surgeon (Dr. Vicente) and your oncologist- recommend further imaging to evaluate brain vasculature and vascular surgery evaluation.      SECONDARY DISCHARGE DIAGNOSES  Diagnosis: HTN (hypertension)  Assessment and Plan of Treatment: continue home meds    Diagnosis: GERD (gastroesophageal reflux disease)  Assessment and Plan of Treatment: continue home meds

## 2024-03-01 NOTE — PHYSICAL THERAPY INITIAL EVALUATION ADULT - NSPTDISCHREC_GEN_A_CORE
PAtient will not be placed on program due to appearing to be at functional baseline and no loss of balance noted/No skilled PT needs

## 2024-03-04 PROBLEM — R16.0 HEPATOMEGALY, NOT ELSEWHERE CLASSIFIED: Chronic | Status: ACTIVE | Noted: 2024-02-23

## 2024-03-04 PROBLEM — J45.909 UNSPECIFIED ASTHMA, UNCOMPLICATED: Chronic | Status: ACTIVE | Noted: 2024-02-23

## 2024-03-04 PROBLEM — R91.8 OTHER NONSPECIFIC ABNORMAL FINDING OF LUNG FIELD: Chronic | Status: ACTIVE | Noted: 2024-02-23

## 2024-03-04 PROBLEM — I72.8 ANEURYSM OF OTHER SPECIFIED ARTERIES: Chronic | Status: ACTIVE | Noted: 2024-02-23

## 2024-03-04 PROBLEM — I10 ESSENTIAL (PRIMARY) HYPERTENSION: Chronic | Status: ACTIVE | Noted: 2024-02-23

## 2024-03-04 PROBLEM — K21.9 GASTRO-ESOPHAGEAL REFLUX DISEASE WITHOUT ESOPHAGITIS: Chronic | Status: ACTIVE | Noted: 2024-02-23

## 2024-03-05 PROBLEM — R91.1 LUNG NODULE: Status: ACTIVE | Noted: 2024-01-03

## 2024-03-07 ENCOUNTER — APPOINTMENT (OUTPATIENT)
Dept: THORACIC SURGERY | Facility: CLINIC | Age: 74
End: 2024-03-07
Payer: MEDICAID

## 2024-03-07 VITALS
SYSTOLIC BLOOD PRESSURE: 128 MMHG | HEART RATE: 81 BPM | BODY MASS INDEX: 25.22 KG/M2 | TEMPERATURE: 97.6 F | RESPIRATION RATE: 16 BRPM | DIASTOLIC BLOOD PRESSURE: 82 MMHG | OXYGEN SATURATION: 94 % | WEIGHT: 161 LBS

## 2024-03-07 DIAGNOSIS — R91.1 SOLITARY PULMONARY NODULE: ICD-10-CM

## 2024-03-07 DIAGNOSIS — K76.9 LIVER DISEASE, UNSPECIFIED: ICD-10-CM

## 2024-03-07 PROCEDURE — 99214 OFFICE O/P EST MOD 30 MIN: CPT

## 2024-03-08 NOTE — CONSULT LETTER
[Dear  ___] : Dear  [unfilled], [Consult Letter:] : I had the pleasure of evaluating your patient, [unfilled]. [Please see my note below.] : Please see my note below. [Consult Closing:] : Thank you very much for allowing me to participate in the care of this patient.  If you have any questions, please do not hesitate to contact me. [Sincerely,] : Sincerely, [FreeTextEntry2] : Elaina Bond MD (ref/PCP) [FreeTextEntry3] : Ruslan Vicente MD, MPH  System Director of Thoracic Surgery  Director of Comprehensive Lung and Foregut [place]  Professor Cardiovascular & Thoracic Surgery   [facility] [employer] at [facility]

## 2024-03-08 NOTE — ASSESSMENT
[FreeTextEntry1] : Mr. NIRU ESQUIVEL, 74 year old male, former smoker, w/ hx of HTN, asthma, who presented with persistent cough and mucus, found to have lung nodule and liver lesion.   PET/CT on 1/20/24 at [facility]: - Irregular pulmonary nodules noted in the RLL measuring 20 mm with SUV max 2.3 (image 80). - No significant adenopathy or masses are present. - No evidence of aneurysm. - Mild centrilobular emphysema. - No evidence of pleural effusion or pleural-based mass.  CT Angio Abdomen on 1/29/24: - RLL nodule measuring 2.0 x 1.6 cm with spiculated appearance (series 3 image 81), not significantly changed since 12/18/2023.  - There is a 5 mm focus of enhancement within the nodule (series 3 image 80), concerning for associated aneurysm. - Mild cardiomegaly. - Right hepatic cyst and subcentimeter hypodense foci that are too small to characterize. Focus of hyperenhancement within the right lobe measuring 1.6 x 1.1 cm (series 2 image 25). Heterogeneous enhancement of the liver without additional discrete lesion identified. Correlate with the outside abdominal MRI exam. - There is a 7 mm focus of hyperenhancement within the pancreatic head (series 3 image 271), which appears to be in continuity with an arterial branch (series 601 images 45-46), possibly an intrapancreatic aneurysm. - A few subcentimeter hypodense foci in the left kidney that are too small to characterize. No hydronephrosis.  Now s/p left hepatic artery aneurysm embolization on 2/29/24 by Dr. Valencia. Hepatic angiogram demonstrating aneurysm arising from the left hepatic artery; embolization performed.  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  I have reviewed the patient's medical records and diagnostic images at time of this office consultation and have made the following recommendation: 1. CT scan reviewed, persistent RLL nodule remains suspicious, I recommended a Rt VATS R.A. wedge rxn of RLL, possible lobectomy, MLND on 4/10/24. Risks and benefits and alternatives explained to patient, all questions answered, patient agreed to proceed with surgery. 2. PFTs 3. Medical clearance, and PST   I, LALA Eubanks, personally performed the evaluation and management (E/M) services for this established patient who presents today with (a) new problem(s)/exacerbation of (an) existing condition(s). That E/M includes conducting the examination, assessing all new/exacerbated conditions, and establishing a new plan of care. Today, my ACP, Servando Engel NP was here to observe my evaluation and management services for this new problem/exacerbated condition to be followed going forward.

## 2024-03-08 NOTE — HISTORY OF PRESENT ILLNESS
[FreeTextEntry1] : Mr. NIRU ESQUIVEL, 74 year old male, former smoker, w/ hx of HTN, asthma, who presented with persistent cough and mucus, found to have lung nodule and liver lesion.   MRI of Brain on 11/14/2023 at [facility]: - Mild chronic white matter microvascular ischemic disease. No acute infarct. - Unremarkable noncontrast appearance of the internal auditory canals and associated structures.  CT Chest on 12/18/23: - 2.2 cm irregular opacity in RLL (4:12) - possible 3.7 cm low-attention lesion in Lt lobe of liver (4:27) - this is sclerosis in Lt inferior pubic ramus   MRI liver on 1/5/24: - large Lt hepatic artery aneurysm corresponding to a mass  - multiple hepatic solid masses suspicious for metastases - few hepatic subcentimeter cysts  PET/CT on 1/20/24 at [facility]: - Irregular pulmonary nodules noted in the RLL measuring 20 mm with SUV max 2.3 (image 80). - No significant adenopathy or masses are present. - No evidence of aneurysm. - Mild centrilobular emphysema. - No evidence of pleural effusion or pleural-based mass.  1/22/24: Case discussed with Dr. Valencia, PET scan is helpful to assess liver mass, but patient would benefit with a CT Angio of the abdomen to assess the Lt hepatic artery/aneurysm. Will order CT Angio as discussed.   CT Angio Abdomen on 1/29/24: - RLL nodule measuring 2.0 x 1.6 cm with spiculated appearance (series 3 image 81), not significantly changed since 12/18/2023.  - There is a 5 mm focus of enhancement within the nodule (series 3 image 80), concerning for associated aneurysm. - Mild cardiomegaly. - Right hepatic cyst and subcentimeter hypodense foci that are too small to characterize. Focus of hyperenhancement within the right lobe measuring 1.6 x 1.1 cm (series 2 image 25). Heterogeneous enhancement of the liver without additional discrete lesion identified. Correlate with the outside abdominal MRI exam. - There is a 7 mm focus of hyperenhancement within the pancreatic head (series 3 image 271), which appears to be in continuity with an arterial branch (series 601 images 45-46), possibly an intrapancreatic aneurysm. - A few subcentimeter hypodense foci in the left kidney that are too small to characterize. No hydronephrosis.  Now s/p left hepatic artery aneurysm embolization on 2/29/24 by Dr. Valencia. Hepatic angiogram demonstrating aneurysm arising from the left hepatic artery; embolization performed.  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  Patient is here today for a follow up.

## 2024-03-19 NOTE — PATIENT PROFILE ADULT - ...
Airway       Patient location during procedure: OR       Procedure Start/Stop Times: 3/19/2024 9:47 AM  Staff -        CRNA: Rudy Salazar APRN CRNA       Performed By: CRNA  Consent for Airway        Urgency: elective  Indications and Patient Condition       Indications for airway management: glory-procedural       Induction type:intravenous       Mask difficulty assessment: 1 - vent by mask    Final Airway Details       Final airway type: endotracheal airway       Successful airway: ETT - single  Endotracheal Airway Details        ETT size (mm): 7.5       Successful intubation technique: direct laryngoscopy       DL Blade Type: Nunez 2       Grade View of Cords: 1       Adjucts: stylet       Position: Right       Measured from: gums/teeth       Secured at (cm): 23       Bite block used: None    Post intubation assessment        Placement verified by: capnometry, equal breath sounds and chest rise        Number of attempts at approach: 1       Number of other approaches attempted: 0       Secured with: tape       Ease of procedure: easy       Dentition: Intact and Unchanged    Medication(s) Administered   Medication Administration Time: 3/19/2024 9:47 AM        
29-Feb-2024 23:49:17

## 2024-03-24 LAB
ALBUMIN SERPL ELPH-MCNC: 4.1 G/DL
ALP BLD-CCNC: 111 U/L
ALT SERPL-CCNC: 27 U/L
ANION GAP SERPL CALC-SCNC: 11 MMOL/L
APTT BLD: 39.1 SEC
AST SERPL-CCNC: 30 U/L
BILIRUB SERPL-MCNC: 0.3 MG/DL
BUN SERPL-MCNC: 17 MG/DL
CALCIUM SERPL-MCNC: 9.7 MG/DL
CHLORIDE SERPL-SCNC: 105 MMOL/L
CO2 SERPL-SCNC: 24 MMOL/L
CREAT SERPL-MCNC: 0.88 MG/DL
EGFR: 91 ML/MIN/1.73M2
GLUCOSE SERPL-MCNC: 88 MG/DL
HCT VFR BLD CALC: 44.8 %
HGB BLD-MCNC: 14.5 G/DL
INR PPP: 1.05 RATIO
MCHC RBC-ENTMCNC: 29.5 PG
MCHC RBC-ENTMCNC: 32.4 GM/DL
MCV RBC AUTO: 91.2 FL
PLATELET # BLD AUTO: 209 K/UL
POTASSIUM SERPL-SCNC: 4.2 MMOL/L
PROT SERPL-MCNC: 7.5 G/DL
PT BLD: 11.9 SEC
RBC # BLD: 4.91 M/UL
RBC # FLD: 14.2 %
SODIUM SERPL-SCNC: 141 MMOL/L
WBC # FLD AUTO: 5.43 K/UL

## 2024-04-02 ENCOUNTER — APPOINTMENT (OUTPATIENT)
Dept: INTERVENTIONAL RADIOLOGY/VASCULAR | Facility: CLINIC | Age: 74
End: 2024-04-02
Payer: MEDICAID

## 2024-04-02 ENCOUNTER — OUTPATIENT (OUTPATIENT)
Dept: OUTPATIENT SERVICES | Facility: HOSPITAL | Age: 74
LOS: 1 days | End: 2024-04-02

## 2024-04-02 VITALS
HEIGHT: 66 IN | TEMPERATURE: 97 F | WEIGHT: 160.06 LBS | OXYGEN SATURATION: 98 % | SYSTOLIC BLOOD PRESSURE: 114 MMHG | HEART RATE: 66 BPM | DIASTOLIC BLOOD PRESSURE: 73 MMHG | RESPIRATION RATE: 16 BRPM

## 2024-04-02 VITALS — HEIGHT: 67 IN | WEIGHT: 160 LBS | BODY MASS INDEX: 25.11 KG/M2

## 2024-04-02 DIAGNOSIS — Z91.89 OTHER SPECIFIED PERSONAL RISK FACTORS, NOT ELSEWHERE CLASSIFIED: ICD-10-CM

## 2024-04-02 DIAGNOSIS — Z98.890 OTHER SPECIFIED POSTPROCEDURAL STATES: Chronic | ICD-10-CM

## 2024-04-02 DIAGNOSIS — R91.1 SOLITARY PULMONARY NODULE: ICD-10-CM

## 2024-04-02 DIAGNOSIS — I72.8 ANEURYSM OF OTHER SPECIFIED ARTERIES: Chronic | ICD-10-CM

## 2024-04-02 DIAGNOSIS — R91.8 OTHER NONSPECIFIC ABNORMAL FINDING OF LUNG FIELD: ICD-10-CM

## 2024-04-02 DIAGNOSIS — I10 ESSENTIAL (PRIMARY) HYPERTENSION: ICD-10-CM

## 2024-04-02 DIAGNOSIS — I72.8 ANEURYSM OF OTHER SPECIFIED ARTERIES: ICD-10-CM

## 2024-04-02 LAB
BLD GP AB SCN SERPL QL: NEGATIVE — SIGNIFICANT CHANGE UP
RH IG SCN BLD-IMP: POSITIVE — SIGNIFICANT CHANGE UP
RH IG SCN BLD-IMP: POSITIVE — SIGNIFICANT CHANGE UP

## 2024-04-02 PROCEDURE — 99215 OFFICE O/P EST HI 40 MIN: CPT

## 2024-04-02 PROCEDURE — T1013A: CUSTOM

## 2024-04-02 NOTE — REASON FOR VISIT
[Post Procedure: _________] : a [unfilled] post procedure visit [Family Member] : family member [Other: ______] : provided by PAT [Time Spent: ____ minutes] : Total time spent using  services: [unfilled] minutes. The patient's primary language is not English thus required  services. [Interpreters_IDNumber] : [id number] [FreeTextEntry3] : Mandarin [TWNoteComboBox1] : Other

## 2024-04-02 NOTE — H&P PST ADULT - RESPIRATORY
normal/clear to auscultation bilaterally/no wheezes/no respiratory distress/airway patent/breath sounds equal/good air movement

## 2024-04-02 NOTE — H&P PST ADULT - HISTORY OF PRESENT ILLNESS
74 yr old male presents with hx of chronic cough with white phlegm for the past 2 yrs, was evaluated by thoracic surgery, was found to have right lung nodule and liver lesion. MRI of liver large left hepatic artery aneurysm s/p clipping, 2/2024. Now scheduled for right video assisted thoracoscopy robotic assisted wedge resection of right lower lobe possible right lower lobectomy mediastinal lymph node dissection  74 yr old male presents with hx of chronic cough with white phlegm for the past 2 yrs, was evaluated by thoracic surgery, was found to have right lung nodule and liver cyst and hepatic artery aneurysm; s/p hepatic artery aneurysm embolization on 2/29/24 Now scheduled for right video assisted thoracoscopy robotic assisted wedge resection of right lower lobe possible right lower lobectomy mediastinal lymph node dissection

## 2024-04-02 NOTE — H&P PST ADULT - PROBLEM SELECTOR PLAN 1
Patient scheduled for surgery on: 4/10/23  Provided with verbal and written presurgical instructions  Verbalized understanding  with teach back on the following: Famotidine for GI prophylaxis and chlorhexidine wash    Lab specimen drawn at [facility] today: type and abo    In chart cbc, cmp, coag, ekg in chart Patient scheduled for surgery on: 4/10/23  Provided with verbal and written presurgical instructions  Verbalized understanding  with teach back on the following: Famotidine for GI prophylaxis and chlorhexidine wash    Lab specimen drawn at [facility] today: type and abo    In chart medical eval, cbc, cmp, coag, ekg in chart

## 2024-04-02 NOTE — ASSESSMENT
[FreeTextEntry1] : 74-year-old male with a past medical history of former smoker, HTN, asthma who presented to [facility] with persistent cough and mucus and was found to have lung nodule, liver lesion and hepatic aneurysm.   Patient presents to IR today s/p left hepatic artery aneurysm embolization on 2/29/24. Since procedure patient states he has been doing well with no issues or concerns at this time. Surgical site has healed well with no bleeding or hematoma. patient was also receieved a high dose of radiation during the procedure and todays visit is for a skin check as well. Skin on his back, abdomen and groin are WDL. Small round pale foci of discoloration are seen in his lower back midline, not in the area involved in imaging for his aneurysm. No skin changes are seen elsewhere.   The patient was given a prescription for MRI/MRA of the abdomen.  This will help assess both the treated hepatic artery aneurysm and small GDA branch aneurysm noted on prior imaging and time of angiography.  The patient and his daughter understand that repeat angiography and embolization of the aneurysm may be necessary.  Patient was instructed to notify interventional radiology after the MRI/MRA is complete.  Additionally, patient was instructed to see dermatology to assess the lesions in his lower back, however these are felt unrelated to the angiogram.  Given the presence of 2 aneurysms, it may be beneficial for the patient to undergo additional imaging evaluation including CNS imaging to exclude the presence of any aneurysms elsewhere.  Plan: RTC after MRI/MRA abdomen complete for further evaluation

## 2024-04-02 NOTE — H&P PST ADULT - GASTROINTESTINAL
normal/soft/nontender/nondistended/normal active bowel sounds/no guarding/no rigidity/no organomegaly/no palpable alvina negative soft/nontender/nondistended/normal active bowel sounds/no guarding

## 2024-04-02 NOTE — PHYSICAL EXAM
[Unchanged from prior visit] : unchanged from prior visit [No Change] : no change [Good] : good [Stable] : stable [A & O x 3] : alert and oriented to person, place, and time [No] : no [No Erythema] : no erythema [Even/ Unlabored] : even and unlabored [No Swelling] : no swelling [No Discharge] : no discharge [No Hematoma] : no hematoma [Right] : right [Fever] : no fever [FreeTextEntry1] : hepatic artery aneurysm [FreeTextEntry2] : 2/29/24

## 2024-04-02 NOTE — HISTORY OF PRESENT ILLNESS
[FreeTextEntry1] : Patient is a 74-year-old male with a past medical history of former smoker, HTN, asthma who presented to [facility] with persistent cough and mucus and was found to have lung nodule, liver lesion and hepatic aneurysm. He has now been referred to IR by Dr. Vicente for consultation regarding a heaptic angiogram.  Patient denies recent fever, chills, shortness of breath, chest pain, nausea, vomiting or diarrhea.  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 Early enhancement of the left portal vein, raising concern for an arterioportal shunt. Question communication between a left portal venous branch and the left hepatic artery aneurysm, which may represent arterioportal fistula. Diffuse prominence of the celiac artery and hepatic arteries."  Of note nosebleed last night lasted few min and then stopped. Patient states he has nosebleed almost every day.  INTERVAL HISTORY 4/2/24 Patient presents to IR today s/p left hepatic artery aneurysm embolization on 2/29/24. Since procedure patient states he has been doing well with no issues or concerns at this time. Surgical site has healed well with no bleeding or hematoma. patient was also receieved a high dose of radiation during the procedure and todays visit is for a skin check as well. Skin on his back, abdomen and groin are WDL. Small round pale foci of discoloration are seen in his lower back midline, not in the area involved in imaging for his aneurysm. No skin changes are seen elsewhere.   Patient denies recent fever, chills, shortness of breath, chest pain, nausea, vomiting or diarrhea.

## 2024-04-09 ENCOUNTER — TRANSCRIPTION ENCOUNTER (OUTPATIENT)
Age: 74
End: 2024-04-09

## 2024-04-10 ENCOUNTER — RESULT REVIEW (OUTPATIENT)
Age: 74
End: 2024-04-10

## 2024-04-10 ENCOUNTER — APPOINTMENT (OUTPATIENT)
Dept: THORACIC SURGERY | Facility: HOSPITAL | Age: 74
End: 2024-04-10

## 2024-04-10 ENCOUNTER — INPATIENT (INPATIENT)
Facility: HOSPITAL | Age: 74
LOS: 2 days | Discharge: HOME | End: 2024-04-13
Attending: THORACIC SURGERY (CARDIOTHORACIC VASCULAR SURGERY) | Admitting: THORACIC SURGERY (CARDIOTHORACIC VASCULAR SURGERY)
Payer: MEDICAID

## 2024-04-10 VITALS
DIASTOLIC BLOOD PRESSURE: 73 MMHG | OXYGEN SATURATION: 98 % | TEMPERATURE: 98 F | HEART RATE: 67 BPM | RESPIRATION RATE: 18 BRPM | HEIGHT: 66 IN | WEIGHT: 160.06 LBS | SYSTOLIC BLOOD PRESSURE: 130 MMHG

## 2024-04-10 DIAGNOSIS — R91.1 SOLITARY PULMONARY NODULE: ICD-10-CM

## 2024-04-10 DIAGNOSIS — Z98.890 OTHER SPECIFIED POSTPROCEDURAL STATES: Chronic | ICD-10-CM

## 2024-04-10 DIAGNOSIS — I72.8 ANEURYSM OF OTHER SPECIFIED ARTERIES: Chronic | ICD-10-CM

## 2024-04-10 LAB
MRSA PCR RESULT.: SIGNIFICANT CHANGE UP
S AUREUS DNA NOSE QL NAA+PROBE: SIGNIFICANT CHANGE UP

## 2024-04-10 PROCEDURE — 88331 PATH CONSLTJ SURG 1 BLK 1SPC: CPT | Mod: 26

## 2024-04-10 PROCEDURE — 88313 SPECIAL STAINS GROUP 2: CPT | Mod: 26

## 2024-04-10 PROCEDURE — 32668 THORACOSCOPY W/W RESECT DIAG: CPT | Mod: 80,RT

## 2024-04-10 PROCEDURE — 32674 THORACOSCOPY LYMPH NODE EXC: CPT | Mod: 80

## 2024-04-10 PROCEDURE — 99233 SBSQ HOSP IP/OBS HIGH 50: CPT

## 2024-04-10 PROCEDURE — 32668 THORACOSCOPY W/W RESECT DIAG: CPT | Mod: RT

## 2024-04-10 PROCEDURE — 88360 TUMOR IMMUNOHISTOCHEM/MANUAL: CPT | Mod: 26

## 2024-04-10 PROCEDURE — 88305 TISSUE EXAM BY PATHOLOGIST: CPT | Mod: 26

## 2024-04-10 PROCEDURE — 32652 THORACOSCOPY REM TOTL CORTEX: CPT | Mod: 80,RT

## 2024-04-10 PROCEDURE — 32652 THORACOSCOPY REM TOTL CORTEX: CPT | Mod: 22,RT

## 2024-04-10 PROCEDURE — 32663 THORACOSCOPY W/LOBECTOMY: CPT | Mod: 22,RT

## 2024-04-10 PROCEDURE — 32663 THORACOSCOPY W/LOBECTOMY: CPT | Mod: 80,RT

## 2024-04-10 PROCEDURE — 88307 TISSUE EXAM BY PATHOLOGIST: CPT | Mod: 26

## 2024-04-10 PROCEDURE — 32674 THORACOSCOPY LYMPH NODE EXC: CPT

## 2024-04-10 PROCEDURE — 88309 TISSUE EXAM BY PATHOLOGIST: CPT | Mod: 26

## 2024-04-10 PROCEDURE — 71045 X-RAY EXAM CHEST 1 VIEW: CPT | Mod: 26

## 2024-04-10 PROCEDURE — S2900 ROBOTIC SURGICAL SYSTEM: CPT | Mod: NC

## 2024-04-10 DEVICE — STAPLER COVIDIEN TRI-STAPLE CURVED 45MM TAN RELOAD: Type: IMPLANTABLE DEVICE | Status: FUNCTIONAL

## 2024-04-10 DEVICE — SURGICEL 2 X 14": Type: IMPLANTABLE DEVICE | Status: FUNCTIONAL

## 2024-04-10 DEVICE — BIOGLUE 5ML SYR: Type: IMPLANTABLE DEVICE | Status: FUNCTIONAL

## 2024-04-10 DEVICE — STAPLER COVIDIEN TRI-STAPLE CURVED 45MM PURPLE RELOAD: Type: IMPLANTABLE DEVICE | Status: FUNCTIONAL

## 2024-04-10 DEVICE — STAPLER COVIDIEN TRI-STAPLE 45MM PURPLE RELOAD: Type: IMPLANTABLE DEVICE | Status: FUNCTIONAL

## 2024-04-10 DEVICE — SURGCEL 4 X 8": Type: IMPLANTABLE DEVICE | Status: FUNCTIONAL

## 2024-04-10 DEVICE — CHEST DRAIN THORACIC ARGYLE PVC 28FR STRAIGHT: Type: IMPLANTABLE DEVICE | Status: FUNCTIONAL

## 2024-04-10 DEVICE — STAPLER COVIDIEN TRI-STAPLE 60MM BLACK RELOAD: Type: IMPLANTABLE DEVICE | Status: FUNCTIONAL

## 2024-04-10 RX ORDER — POLYETHYLENE GLYCOL 3350 17 G/17G
17 POWDER, FOR SOLUTION ORAL DAILY
Refills: 0 | Status: DISCONTINUED | OUTPATIENT
Start: 2024-04-10 | End: 2024-04-13

## 2024-04-10 RX ORDER — NALOXONE HYDROCHLORIDE 4 MG/.1ML
0.1 SPRAY NASAL
Refills: 0 | Status: DISCONTINUED | OUTPATIENT
Start: 2024-04-10 | End: 2024-04-13

## 2024-04-10 RX ORDER — HYDROMORPHONE HYDROCHLORIDE 2 MG/ML
30 INJECTION INTRAMUSCULAR; INTRAVENOUS; SUBCUTANEOUS
Refills: 0 | Status: DISCONTINUED | OUTPATIENT
Start: 2024-04-10 | End: 2024-04-11

## 2024-04-10 RX ORDER — SODIUM CHLORIDE 9 MG/ML
1000 INJECTION, SOLUTION INTRAVENOUS
Refills: 0 | Status: DISCONTINUED | OUTPATIENT
Start: 2024-04-10 | End: 2024-04-11

## 2024-04-10 RX ORDER — HEPARIN SODIUM 5000 [USP'U]/ML
5000 INJECTION INTRAVENOUS; SUBCUTANEOUS ONCE
Refills: 0 | Status: COMPLETED | OUTPATIENT
Start: 2024-04-10 | End: 2024-04-10

## 2024-04-10 RX ORDER — ACETAMINOPHEN 500 MG
975 TABLET ORAL ONCE
Refills: 0 | Status: COMPLETED | OUTPATIENT
Start: 2024-04-10 | End: 2024-04-10

## 2024-04-10 RX ORDER — AMLODIPINE BESYLATE 2.5 MG/1
1 TABLET ORAL
Refills: 0 | DISCHARGE

## 2024-04-10 RX ORDER — HEPARIN SODIUM 5000 [USP'U]/ML
5000 INJECTION INTRAVENOUS; SUBCUTANEOUS EVERY 8 HOURS
Refills: 0 | Status: DISCONTINUED | OUTPATIENT
Start: 2024-04-10 | End: 2024-04-13

## 2024-04-10 RX ORDER — NALBUPHINE HYDROCHLORIDE 10 MG/ML
2.5 INJECTION, SOLUTION INTRAMUSCULAR; INTRAVENOUS; SUBCUTANEOUS EVERY 6 HOURS
Refills: 0 | Status: DISCONTINUED | OUTPATIENT
Start: 2024-04-10 | End: 2024-04-13

## 2024-04-10 RX ORDER — ONDANSETRON 8 MG/1
4 TABLET, FILM COATED ORAL EVERY 6 HOURS
Refills: 0 | Status: DISCONTINUED | OUTPATIENT
Start: 2024-04-10 | End: 2024-04-13

## 2024-04-10 RX ORDER — LOSARTAN POTASSIUM 100 MG/1
1 TABLET, FILM COATED ORAL
Refills: 0 | DISCHARGE

## 2024-04-10 RX ORDER — HYDROMORPHONE HYDROCHLORIDE 2 MG/ML
0.5 INJECTION INTRAMUSCULAR; INTRAVENOUS; SUBCUTANEOUS
Refills: 0 | Status: DISCONTINUED | OUTPATIENT
Start: 2024-04-10 | End: 2024-04-11

## 2024-04-10 RX ORDER — SENNA PLUS 8.6 MG/1
1 TABLET ORAL DAILY
Refills: 0 | Status: DISCONTINUED | OUTPATIENT
Start: 2024-04-10 | End: 2024-04-13

## 2024-04-10 RX ADMIN — Medication 975 MILLIGRAM(S): at 10:24

## 2024-04-10 RX ADMIN — HEPARIN SODIUM 5000 UNIT(S): 5000 INJECTION INTRAVENOUS; SUBCUTANEOUS at 22:00

## 2024-04-10 RX ADMIN — HYDROMORPHONE HYDROCHLORIDE 30 MILLILITER(S): 2 INJECTION INTRAMUSCULAR; INTRAVENOUS; SUBCUTANEOUS at 15:00

## 2024-04-10 RX ADMIN — HYDROMORPHONE HYDROCHLORIDE 30 MILLILITER(S): 2 INJECTION INTRAMUSCULAR; INTRAVENOUS; SUBCUTANEOUS at 20:20

## 2024-04-10 RX ADMIN — SODIUM CHLORIDE 30 MILLILITER(S): 9 INJECTION, SOLUTION INTRAVENOUS at 20:19

## 2024-04-10 RX ADMIN — HYDROMORPHONE HYDROCHLORIDE 0.5 MILLIGRAM(S): 2 INJECTION INTRAMUSCULAR; INTRAVENOUS; SUBCUTANEOUS at 15:30

## 2024-04-10 RX ADMIN — HEPARIN SODIUM 5000 UNIT(S): 5000 INJECTION INTRAVENOUS; SUBCUTANEOUS at 10:24

## 2024-04-10 NOTE — BRIEF OPERATIVE NOTE - NSICDXBRIEFPROCEDURE_GEN_ALL_CORE_FT
PROCEDURES:  Thoracoscopic robotic assisted procedure 10-Apr-2024 14:37:08 FB, RA RVATS, Pneumonolysis, RLL Wedge, Completion RLL Lobectomy, MLND Tawny Argueta

## 2024-04-10 NOTE — PATIENT PROFILE ADULT - FUNCTIONAL ASSESSMENT - BASIC MOBILITY 1.
Diagnosis: m myeloma     Agent/Regimen: pomalyst    ECO - Fully active, able to carry on all pre-disease activities without restrictions.    Nursing Assessment: A focused nursing assessment addressing the toxicity of oral chemotherapy was performed and the patient reports the following:   Nausea: NO  Vomiting: NO  Fever: NO  Chills: NO  Other signs of infection: NO  Bleeding: NO  Mucositis: NO  Diarrhea: NO  Constipation: NO  Anorexia: NO  Dysuria: NO  Urinary Bleeding: NO  Cough: NO  Shortness of Breath: YES, with exertion  Fatigue/Weakness: NO  Numbness/Tingling: NO  Other Neuropathies: NO  Edema: NO  Rash: NO  Hand/Foot Syndrome: NO  Anxiety/Depression/Insomnia: NO  Pain: NO    Treatment Plan: - Treatment consent signed  - Patient has valid pre-authorization  - Prescription refilled     Adherence: Discussed oral chemo adherence with patient. Patient taking medication as prescribed.        Next appointment scheduled: 19     Patient instructed to call the office with any questions or concerns.    Pt discharged to patient discharged to home per self, ambulatory        3 = A little assistance

## 2024-04-10 NOTE — PATIENT PROFILE ADULT - FALL HARM RISK - UNIVERSAL INTERVENTIONS
Bed in lowest position, wheels locked, appropriate side rails in place/Call bell, personal items and telephone in reach/Instruct patient to call for assistance before getting out of bed or chair/Non-slip footwear when patient is out of bed/[place] to call system/Physically safe environment - no spills, clutter or unnecessary equipment/Purposeful Proactive Rounding/Room/bathroom lighting operational, light cord in reach

## 2024-04-11 ENCOUNTER — TRANSCRIPTION ENCOUNTER (OUTPATIENT)
Age: 74
End: 2024-04-11

## 2024-04-11 LAB
ANION GAP SERPL CALC-SCNC: 13 MMOL/L — SIGNIFICANT CHANGE UP (ref 7–14)
BASOPHILS # BLD AUTO: 0 K/UL — SIGNIFICANT CHANGE UP (ref 0–0.2)
BASOPHILS NFR BLD AUTO: 0 % — SIGNIFICANT CHANGE UP (ref 0–2)
BUN SERPL-MCNC: 22 MG/DL — SIGNIFICANT CHANGE UP (ref 7–23)
CALCIUM SERPL-MCNC: 9.1 MG/DL — SIGNIFICANT CHANGE UP (ref 8.4–10.5)
CHLORIDE SERPL-SCNC: 105 MMOL/L — SIGNIFICANT CHANGE UP (ref 98–107)
CO2 SERPL-SCNC: 20 MMOL/L — LOW (ref 22–31)
CREAT SERPL-MCNC: 0.97 MG/DL — SIGNIFICANT CHANGE UP (ref 0.5–1.3)
EGFR: 82 ML/MIN/1.73M2 — SIGNIFICANT CHANGE UP
EOSINOPHIL # BLD AUTO: 0 K/UL — SIGNIFICANT CHANGE UP (ref 0–0.5)
EOSINOPHIL NFR BLD AUTO: 0 % — SIGNIFICANT CHANGE UP (ref 0–6)
GLUCOSE SERPL-MCNC: 133 MG/DL — HIGH (ref 70–99)
HCT VFR BLD CALC: 36.6 % — LOW (ref 39–50)
HGB BLD-MCNC: 12.6 G/DL — LOW (ref 13–17)
IANC: 6.19 K/UL — SIGNIFICANT CHANGE UP (ref 1.8–7.4)
IMM GRANULOCYTES NFR BLD AUTO: 0.4 % — SIGNIFICANT CHANGE UP (ref 0–0.9)
LYMPHOCYTES # BLD AUTO: 1.01 K/UL — SIGNIFICANT CHANGE UP (ref 1–3.3)
LYMPHOCYTES # BLD AUTO: 12.7 % — LOW (ref 13–44)
MAGNESIUM SERPL-MCNC: 2 MG/DL — SIGNIFICANT CHANGE UP (ref 1.6–2.6)
MCHC RBC-ENTMCNC: 30.5 PG — SIGNIFICANT CHANGE UP (ref 27–34)
MCHC RBC-ENTMCNC: 34.4 GM/DL — SIGNIFICANT CHANGE UP (ref 32–36)
MCV RBC AUTO: 88.6 FL — SIGNIFICANT CHANGE UP (ref 80–100)
MONOCYTES # BLD AUTO: 0.71 K/UL — SIGNIFICANT CHANGE UP (ref 0–0.9)
MONOCYTES NFR BLD AUTO: 8.9 % — SIGNIFICANT CHANGE UP (ref 2–14)
NEUTROPHILS # BLD AUTO: 6.19 K/UL — SIGNIFICANT CHANGE UP (ref 1.8–7.4)
NEUTROPHILS NFR BLD AUTO: 78 % — HIGH (ref 43–77)
NRBC # BLD: 0 /100 WBCS — SIGNIFICANT CHANGE UP (ref 0–0)
NRBC # FLD: 0 K/UL — SIGNIFICANT CHANGE UP (ref 0–0)
PHOSPHATE SERPL-MCNC: 2.9 MG/DL — SIGNIFICANT CHANGE UP (ref 2.5–4.5)
PLATELET # BLD AUTO: 149 K/UL — LOW (ref 150–400)
POTASSIUM SERPL-MCNC: 4.5 MMOL/L — SIGNIFICANT CHANGE UP (ref 3.5–5.3)
POTASSIUM SERPL-SCNC: 4.5 MMOL/L — SIGNIFICANT CHANGE UP (ref 3.5–5.3)
RBC # BLD: 4.13 M/UL — LOW (ref 4.2–5.8)
RBC # FLD: 13.8 % — SIGNIFICANT CHANGE UP (ref 10.3–14.5)
SODIUM SERPL-SCNC: 138 MMOL/L — SIGNIFICANT CHANGE UP (ref 135–145)
WBC # BLD: 7.94 K/UL — SIGNIFICANT CHANGE UP (ref 3.8–10.5)
WBC # FLD AUTO: 7.94 K/UL — SIGNIFICANT CHANGE UP (ref 3.8–10.5)

## 2024-04-11 PROCEDURE — 71045 X-RAY EXAM CHEST 1 VIEW: CPT | Mod: 26

## 2024-04-11 PROCEDURE — 99233 SBSQ HOSP IP/OBS HIGH 50: CPT

## 2024-04-11 RX ORDER — ACETAMINOPHEN 500 MG
650 TABLET ORAL EVERY 6 HOURS
Refills: 0 | Status: COMPLETED | OUTPATIENT
Start: 2024-04-11 | End: 2024-04-13

## 2024-04-11 RX ORDER — SODIUM CHLORIDE 9 MG/ML
4 INJECTION INTRAMUSCULAR; INTRAVENOUS; SUBCUTANEOUS EVERY 6 HOURS
Refills: 0 | Status: DISCONTINUED | OUTPATIENT
Start: 2024-04-11 | End: 2024-04-13

## 2024-04-11 RX ORDER — ALBUTEROL 90 UG/1
2.5 AEROSOL, METERED ORAL EVERY 6 HOURS
Refills: 0 | Status: DISCONTINUED | OUTPATIENT
Start: 2024-04-11 | End: 2024-04-13

## 2024-04-11 RX ORDER — DORNASE ALFA 1 MG/ML
2.5 SOLUTION RESPIRATORY (INHALATION) DAILY
Refills: 0 | Status: DISCONTINUED | OUTPATIENT
Start: 2024-04-11 | End: 2024-04-13

## 2024-04-11 RX ORDER — LIDOCAINE 4 G/100G
1 CREAM TOPICAL EVERY 24 HOURS
Refills: 0 | Status: DISCONTINUED | OUTPATIENT
Start: 2024-04-11 | End: 2024-04-13

## 2024-04-11 RX ORDER — OXYCODONE HYDROCHLORIDE 5 MG/1
5 TABLET ORAL
Refills: 0 | Status: DISCONTINUED | OUTPATIENT
Start: 2024-04-11 | End: 2024-04-13

## 2024-04-11 RX ADMIN — ALBUTEROL 2.5 MILLIGRAM(S): 90 AEROSOL, METERED ORAL at 10:31

## 2024-04-11 RX ADMIN — LIDOCAINE 1 PATCH: 4 CREAM TOPICAL at 19:00

## 2024-04-11 RX ADMIN — Medication 650 MILLIGRAM(S): at 17:00

## 2024-04-11 RX ADMIN — DORNASE ALFA 2.5 MILLIGRAM(S): 1 SOLUTION RESPIRATORY (INHALATION) at 10:31

## 2024-04-11 RX ADMIN — HEPARIN SODIUM 5000 UNIT(S): 5000 INJECTION INTRAVENOUS; SUBCUTANEOUS at 14:33

## 2024-04-11 RX ADMIN — Medication 650 MILLIGRAM(S): at 23:29

## 2024-04-11 RX ADMIN — SENNA PLUS 1 TABLET(S): 8.6 TABLET ORAL at 12:33

## 2024-04-11 RX ADMIN — LIDOCAINE 1 PATCH: 4 CREAM TOPICAL at 12:33

## 2024-04-11 RX ADMIN — ALBUTEROL 2.5 MILLIGRAM(S): 90 AEROSOL, METERED ORAL at 15:37

## 2024-04-11 RX ADMIN — POLYETHYLENE GLYCOL 3350 17 GRAM(S): 17 POWDER, FOR SOLUTION ORAL at 12:33

## 2024-04-11 RX ADMIN — Medication 650 MILLIGRAM(S): at 12:32

## 2024-04-11 RX ADMIN — ALBUTEROL 2.5 MILLIGRAM(S): 90 AEROSOL, METERED ORAL at 22:59

## 2024-04-11 RX ADMIN — HEPARIN SODIUM 5000 UNIT(S): 5000 INJECTION INTRAVENOUS; SUBCUTANEOUS at 22:00

## 2024-04-11 RX ADMIN — HEPARIN SODIUM 5000 UNIT(S): 5000 INJECTION INTRAVENOUS; SUBCUTANEOUS at 06:32

## 2024-04-11 NOTE — DISCHARGE NOTE PROVIDER - NSDCFUADDINST_GEN_ALL_CORE_FT
Please walk 4-5 x per day, Increase as tolerated. You may climb stairs.     Continue to use incentive spirometer 10 times every hour.     Please keep all wounds covered for first 24 hours. You may uncover it after 24 hours and start showering daily. If the wound gets wet, please dap dry it and keep it clean. The suture will be removed in office at follow up apt. If you see abnormal discharge from wound, wound is getting swollen and red, more painful to touch, fever, please call the office at [phone] or go to the ER as soon as possible.  Please walk 4-5 x per day, Increase as tolerated. You may climb stairs.     Continue to use incentive spirometer 10 times every hour.   Remove Right chest dressing tomorrow then begin to shower. Keep all wounds uncovered. Please shower daily. The suture will be removed in office at follow up apt. If you see abnormal discharge from wound, wound is getting swollen and red, more painful to touch, fever, please call the office at [phone] or go to the ER as soon as possible.

## 2024-04-11 NOTE — DISCHARGE NOTE PROVIDER - NSDCCPTREATMENT_GEN_ALL_CORE_FT
PRINCIPAL PROCEDURE  Procedure: Thoracoscopic robotic assisted procedure  Findings and Treatment: FB, RA RVATS, Pneumonolysis, RLL Wedge, Completion RLL Lobectomy, MLND

## 2024-04-11 NOTE — DISCHARGE NOTE PROVIDER - NSDCMRMEDTOKEN_GEN_ALL_CORE_FT
amLODIPine 5 mg oral tablet: 1 tab(s) orally once a day  losartan 50 mg oral tablet: 1 tab(s) orally once a day (at bedtime)   amLODIPine 5 mg oral tablet: 1 tab(s) orally once a day  losartan 50 mg oral tablet: 1 tab(s) orally once a day (at bedtime)  oxyCODONE 5 mg oral tablet: 1 tab(s) orally every 4 hours as needed for  severe pain MDD: 6 tabs  polyethylene glycol 3350 oral powder for reconstitution: 17 gram(s) orally once a day  senna leaf extract oral tablet: 1 tab(s) orally once a day  Tylenol 325 mg oral tablet: 2 tab(s) orally every 6 hours as needed for mild to moderate pain

## 2024-04-11 NOTE — DISCHARGE NOTE PROVIDER - HOSPITAL COURSE
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 On POD ____, chest tube was removed and post pulled xray was reviewed. Pt is stable for discharge. 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 Post op non complicated. CT remained for output and small air leak. Today, output improved. No AL seen. CT removed at bedside. F/u   CXR stable, w stable sml ptx. VATS c/d/i. Pt on RA. OOB w minimal assist. C/o pain but relieved w Oxy. All teaching and instructions given using   phone in Mandarin. Pt cleared for dc to home by Dr. Hairston.   Vital Signs Last 24 Hrs  T(C): 36.7 (13 Apr 2024 13:03), Max: 37.3 (13 Apr 2024 00:35)  T(F): 98 (13 Apr 2024 13:03), Max: 99.1 (13 Apr 2024 00:35)  HR: 60 (13 Apr 2024 13:03) (55 - 94)  BP: 119/67 (13 Apr 2024 13:03) (101/62 - 135/68)  BP(mean): --  RR: 18 (13 Apr 2024 13:03) (17 - 18)  SpO2: 95% (13 Apr 2024 13:03) (90% - 99%)    Parameters below as of 13 Apr 2024 13:03  Patient On (Oxygen Delivery Method): room air

## 2024-04-11 NOTE — DISCHARGE NOTE PROVIDER - NSDCFUADDAPPT_GEN_ALL_CORE_FT
Provide food preferences to optimize oral intake. Encourage po intake/General/healthful diet/Other (specify) Follow up with Dr. Vicente in 10-14 days. Call Thoracic Surgery office [phone] to schedule an appointment. Please, obtain a CXR 1-2 days prior to your appointment and bring a copy with you.  Please, make an appointment with your Primary care provider. Follow up with Dr. Vicente in 2 weeks. Call Thoracic Surgery office [phone] to schedule an appointment. Please, obtain a chest x ray 1-2 days prior to your appointment and bring a copy with you.    See your Primary care provider as needed.

## 2024-04-12 LAB
ANION GAP SERPL CALC-SCNC: 11 MMOL/L — SIGNIFICANT CHANGE UP (ref 7–14)
APTT BLD: 35.7 SEC — HIGH (ref 24.5–35.6)
BLD GP AB SCN SERPL QL: NEGATIVE — SIGNIFICANT CHANGE UP
BUN SERPL-MCNC: 22 MG/DL — SIGNIFICANT CHANGE UP (ref 7–23)
CALCIUM SERPL-MCNC: 8.6 MG/DL — SIGNIFICANT CHANGE UP (ref 8.4–10.5)
CHLORIDE SERPL-SCNC: 105 MMOL/L — SIGNIFICANT CHANGE UP (ref 98–107)
CO2 SERPL-SCNC: 21 MMOL/L — LOW (ref 22–31)
CREAT SERPL-MCNC: 0.95 MG/DL — SIGNIFICANT CHANGE UP (ref 0.5–1.3)
EGFR: 85 ML/MIN/1.73M2 — SIGNIFICANT CHANGE UP
GLUCOSE SERPL-MCNC: 103 MG/DL — HIGH (ref 70–99)
HCT VFR BLD CALC: 34.9 % — LOW (ref 39–50)
HGB BLD-MCNC: 11.9 G/DL — LOW (ref 13–17)
INR BLD: 1.07 RATIO — SIGNIFICANT CHANGE UP (ref 0.85–1.18)
MAGNESIUM SERPL-MCNC: 1.9 MG/DL — SIGNIFICANT CHANGE UP (ref 1.6–2.6)
MCHC RBC-ENTMCNC: 30.5 PG — SIGNIFICANT CHANGE UP (ref 27–34)
MCHC RBC-ENTMCNC: 34.1 GM/DL — SIGNIFICANT CHANGE UP (ref 32–36)
MCV RBC AUTO: 89.5 FL — SIGNIFICANT CHANGE UP (ref 80–100)
NRBC # BLD: 0 /100 WBCS — SIGNIFICANT CHANGE UP (ref 0–0)
NRBC # FLD: 0 K/UL — SIGNIFICANT CHANGE UP (ref 0–0)
PHOSPHATE SERPL-MCNC: 2.8 MG/DL — SIGNIFICANT CHANGE UP (ref 2.5–4.5)
PLATELET # BLD AUTO: 127 K/UL — LOW (ref 150–400)
POTASSIUM SERPL-MCNC: 4.5 MMOL/L — SIGNIFICANT CHANGE UP (ref 3.5–5.3)
POTASSIUM SERPL-SCNC: 4.5 MMOL/L — SIGNIFICANT CHANGE UP (ref 3.5–5.3)
PROTHROM AB SERPL-ACNC: 12.1 SEC — SIGNIFICANT CHANGE UP (ref 9.5–13)
RBC # BLD: 3.9 M/UL — LOW (ref 4.2–5.8)
RBC # FLD: 14.3 % — SIGNIFICANT CHANGE UP (ref 10.3–14.5)
RH IG SCN BLD-IMP: POSITIVE — SIGNIFICANT CHANGE UP
SODIUM SERPL-SCNC: 137 MMOL/L — SIGNIFICANT CHANGE UP (ref 135–145)
WBC # BLD: 8.29 K/UL — SIGNIFICANT CHANGE UP (ref 3.8–10.5)
WBC # FLD AUTO: 8.29 K/UL — SIGNIFICANT CHANGE UP (ref 3.8–10.5)

## 2024-04-12 PROCEDURE — 71045 X-RAY EXAM CHEST 1 VIEW: CPT | Mod: 26,76

## 2024-04-12 RX ORDER — MAGNESIUM SULFATE 500 MG/ML
1 VIAL (ML) INJECTION ONCE
Refills: 0 | Status: COMPLETED | OUTPATIENT
Start: 2024-04-12 | End: 2024-04-12

## 2024-04-12 RX ADMIN — HEPARIN SODIUM 5000 UNIT(S): 5000 INJECTION INTRAVENOUS; SUBCUTANEOUS at 05:30

## 2024-04-12 RX ADMIN — Medication 100 GRAM(S): at 05:48

## 2024-04-12 RX ADMIN — DORNASE ALFA 2.5 MILLIGRAM(S): 1 SOLUTION RESPIRATORY (INHALATION) at 10:52

## 2024-04-12 RX ADMIN — ALBUTEROL 2.5 MILLIGRAM(S): 90 AEROSOL, METERED ORAL at 10:50

## 2024-04-12 RX ADMIN — ALBUTEROL 2.5 MILLIGRAM(S): 90 AEROSOL, METERED ORAL at 21:58

## 2024-04-12 RX ADMIN — ALBUTEROL 2.5 MILLIGRAM(S): 90 AEROSOL, METERED ORAL at 15:03

## 2024-04-12 RX ADMIN — Medication 650 MILLIGRAM(S): at 18:30

## 2024-04-12 RX ADMIN — POLYETHYLENE GLYCOL 3350 17 GRAM(S): 17 POWDER, FOR SOLUTION ORAL at 11:15

## 2024-04-12 RX ADMIN — Medication 650 MILLIGRAM(S): at 17:30

## 2024-04-12 RX ADMIN — SODIUM CHLORIDE 4 MILLILITER(S): 9 INJECTION INTRAMUSCULAR; INTRAVENOUS; SUBCUTANEOUS at 10:50

## 2024-04-12 RX ADMIN — SENNA PLUS 1 TABLET(S): 8.6 TABLET ORAL at 11:15

## 2024-04-12 RX ADMIN — LIDOCAINE 1 PATCH: 4 CREAM TOPICAL at 00:00

## 2024-04-12 RX ADMIN — SODIUM CHLORIDE 4 MILLILITER(S): 9 INJECTION INTRAMUSCULAR; INTRAVENOUS; SUBCUTANEOUS at 15:00

## 2024-04-12 RX ADMIN — SODIUM CHLORIDE 4 MILLILITER(S): 9 INJECTION INTRAMUSCULAR; INTRAVENOUS; SUBCUTANEOUS at 21:58

## 2024-04-12 RX ADMIN — HEPARIN SODIUM 5000 UNIT(S): 5000 INJECTION INTRAVENOUS; SUBCUTANEOUS at 14:42

## 2024-04-12 RX ADMIN — Medication 650 MILLIGRAM(S): at 11:15

## 2024-04-12 RX ADMIN — Medication 650 MILLIGRAM(S): at 05:48

## 2024-04-12 RX ADMIN — HEPARIN SODIUM 5000 UNIT(S): 5000 INJECTION INTRAVENOUS; SUBCUTANEOUS at 21:43

## 2024-04-12 RX ADMIN — Medication 650 MILLIGRAM(S): at 23:12

## 2024-04-12 RX ADMIN — ALBUTEROL 2.5 MILLIGRAM(S): 90 AEROSOL, METERED ORAL at 03:48

## 2024-04-12 NOTE — PROGRESS NOTE ADULT - SUBJECTIVE AND OBJECTIVE BOX
CHIEF COMPLAINT: FOLLOW UP IN ICU FOR POSTOPERATIVE CARE OF PATIENT WHO IS S/P LUNG RESECTION      PROCEDURES: Robotic RVATS, Pneumonolysis, RLL Wedge, Completion RLL Lobectomy, MLND  10-Apr-2024      ISSUES:   Lung nodule  Post op pain  Chest tube in place  Asthma  GERD  Liver mass  Hx of hepatic artery aneurysm s/p embolization (2/2024)    INTERVAL EVENTS:   Chest tube with no airleak.      HISTORY:   Patient reports moderate pain at chest wall incision sites which is worse with coughing and deep breathing without associated fever or dyspnea. Pain is improved with use of pain meds.     PHYSICAL EXAM:   Gen: Comfortable, No acute distress  Eyes: Sclera white, Conjunctiva normal, Eyelids normal, Pupils symmetrical   ENT: Mucous membranes moist,  ,  ,    Neck: Trachea midline,  ,  ,  ,  ,  ,    CV: Rate regular, Rhythm regular,  ,  ,    Resp: Breath sounds clear, No accessory muscles use, L chest tube in place,  ,    Abd: Soft, Non-distended, Non-tender,   ,  ,  ,    Skin: Warm, No peripheral edema of lower extremities,  ,    : No escalona  Neuro: Moving all 4 extremities,    Psych: A&Ox3      ASSESSMENT AND PLAN:     NEURO:  Post-operative Pain - Stable. Pain control with oxycodone PO          RESPIRATORY:  Stable on room air - Incentive spirometry. Chest PT and suctioning of secretions. Out of bed to chair and ambulate with assistance. Continuous pulse oximetry for support & to prevent decompensation.              Asthma - worsened by thoracic surgery. Continue bronchodilators.         CARDIOVASCULAR:  Hemodynamically stable - Not on pressors. Continue hemodynamic monitoring.  Telemetry (medical test) - Reviewed by me today independently. Normal sinus rhythm.    HTN - stable. Hold home antihypertensives for now.     Hx of hepatic artery aneurysm s/p embolization (2/2024)         RENAL:  Stable - Monitor IOs and electrolytes. Keep K above 4.0 and Mg above 2.0.            GASTROINTESTINAL:  GI prophylaxis not indicated  Zofran and Reglan IV PRN for nausea  Regular consistency diet        GERD - stable.        HEMATOLOGIC:  No signs of active bleeding. Monitor Hgb in CBC in AM  DVT prophylaxis with heparin subQ               INFECTIOUS DISEASE:  All surgical sites appear clean. No signs of active infection. Will monitor for fever and leukocytosis.         ENDOCRINE:  Stable – Monitor glucose fingersticks for goal 120-180.               ONCOLOGY:  Lung nodule - Improved. S/P resection. Follow up final pathology.  - Chest tube – Pleurevac regulated suctioning. Monitor chest tube output.    Liver mass - follow up lung pathology.        Pertinent clinical, laboratory, radiographic, hemodynamic, echocardiographic, respiratory data, microbiologic data and chart were reviewed by myself and analyzed frequently throughout the course of the day and night by myself.    Plan discussed at length with the CTICU staff and Attending CT Surgeon -   Dr Ruslan Vicente.      Patient's status was discussed with patient at bedside.       	  I have spent 50 minutes of time with this patient monitoring hemodynamic status, respiratory status, and coordinating care in the ICU.    ________________________________________________      _________________________  VITAL SIGNS:  Vital Signs Last 24 Hrs  T(C): 36.7 (11 Apr 2024 12:00), Max: 36.8 (11 Apr 2024 00:00)  T(F): 98 (11 Apr 2024 12:00), Max: 98.3 (11 Apr 2024 00:00)  HR: 79 (11 Apr 2024 13:00) (66 - 79)  BP: 119/82 (11 Apr 2024 12:00) (99/59 - 135/88)  BP(mean): 91 (11 Apr 2024 12:00) (66 - 104)  RR: 23 (11 Apr 2024 13:00) (10 - 23)  SpO2: 98% (11 Apr 2024 13:00) (96% - 100%)    Parameters below as of 11 Apr 2024 13:00  Patient On (Oxygen Delivery Method): room air      I/Os:   I&O's Detail    10 Apr 2024 07:01  -  11 Apr 2024 07:00  --------------------------------------------------------  IN:    Lactated Ringers: 540 mL  Total IN: 540 mL    OUT:    Chest Tube (mL): 300 mL    Voided (mL): 625 mL  Total OUT: 925 mL    Total NET: -385 mL      11 Apr 2024 07:01  -  11 Apr 2024 13:35  --------------------------------------------------------  IN:    Lactated Ringers: 210 mL  Total IN: 210 mL    OUT:    Chest Tube (mL): 100 mL  Total OUT: 100 mL    Total NET: 110 mL              MEDICATIONS:  MEDICATIONS  (STANDING):  acetaminophen     Tablet .. 650 milliGRAM(s) Oral every 6 hours  albuterol    0.083% 2.5 milliGRAM(s) Nebulizer every 6 hours  dornase [place] Solution 2.5 milliGRAM(s) Inhalation daily  heparin   Injectable 5000 Unit(s) SubCutaneous every 8 hours  lactated ringers. 1000 milliLiter(s) (30 mL/Hr) IV Continuous <Continuous>  lidocaine   4% Patch 1 Patch Transdermal every 24 hours  polyethylene glycol 3350 17 Gram(s) Oral daily  senna 1 Tablet(s) Oral daily  sodium chloride 3%  Inhalation 4 milliLiter(s) Inhalation every 6 hours    MEDICATIONS  (PRN):  nalbuphine Injectable 2.5 milliGRAM(s) IV Push every 6 hours PRN Pruritus  naloxone Injectable 0.1 milliGRAM(s) IV Push every 3 minutes PRN For ANY of the following changes in patient status:  A. RR LESS THAN 10 breaths per minute, B. Oxygen saturation LESS THAN 90%, C. Sedation score of 6  ondansetron Injectable 4 milliGRAM(s) IV Push every 6 hours PRN Nausea  oxyCODONE    IR 5 milliGRAM(s) Oral every 3 hours PRN Moderate - Severe Pain (4 - 10)      LABS:  Laboratory data was independently reviewed by me today.                           12.6   7.94  )-----------( 149      ( 11 Apr 2024 02:33 )             36.6     04-11    138  |  105  |  22  ----------------------------<  133<H>  4.5   |  20<L>  |  0.97    Ca    9.1      11 Apr 2024 02:33  Phos  2.9     04-11  Mg     2.00     04-11            Urinalysis Basic - ( 11 Apr 2024 02:33 )    Color: x / Appearance: x / SG: x / pH: x  Gluc: 133 mg/dL / Ketone: x  / Bili: x / Urobili: x   Blood: x / Protein: x / Nitrite: x   Leuk Esterase: x / RBC: x / WBC x   Sq Epi: x / Non Sq Epi: x / Bacteria: x        RADIOLOGY:   Radiology images were independently reviewed by me today. Reports were reviewed by me today.    Xray Chest 1 View- PORTABLE-Urgent:   ACC: [id number] EXAM:  XR CHEST PORTABLE URGENT 1V   ORDERED BY: MERLE DURANT     PROCEDURE DATE:  04/10/2024          INTERPRETATION:  Exam:XR CHEST URGENT    clinical history:Status post VATS surgery    Right-sided chest tube. Subcutaneousair at the base the neck on the   right. No definite pneumothorax. Left lung grossly clear.    IMPRESSION: Postoperative radiograph as above    --- End of Report ---            RACHELE DOWELL MD; Attending Radiologist  This document has been electronically signed. Apr 10 2024  3:58PM (04-10-24 @ 15:53)  
Anesthesia Pain Management Service    SUBJECTIVE: Patient is doing well with IV PCA and no significant problems reported. Pain mostly with coughing.    Pain Scale Score	At rest: ___ 	With Activity: ___ 	[X ] Refer to charted pain scores    THERAPY:    [ ] IV PCA Morphine		[ ] 5 mg/mL	[ ] 1 mg/mL  [X ] IV PCA Hydromorphone	[ ] 5 mg/mL	[X ] 1 mg/mL  [ ] IV PCA Fentanyl		[ ] 50 micrograms/mL    Demand dose __0.2_ lockout __6_ (minutes) Continuous Rate _0__ Total: _2.9__   mg used (in past 24 hrs)      MEDICATIONS  (STANDING):  albuterol    0.083% 2.5 milliGRAM(s) Nebulizer every 6 hours  dornase [place] Solution 2.5 milliGRAM(s) Inhalation daily  heparin   Injectable 5000 Unit(s) SubCutaneous every 8 hours  lactated ringers. 1000 milliLiter(s) (30 mL/Hr) IV Continuous <Continuous>  lidocaine   4% Patch 1 Patch Transdermal every 24 hours  polyethylene glycol 3350 17 Gram(s) Oral daily  senna 1 Tablet(s) Oral daily  sodium chloride 3%  Inhalation 4 milliLiter(s) Inhalation every 6 hours    MEDICATIONS  (PRN):  nalbuphine Injectable 2.5 milliGRAM(s) IV Push every 6 hours PRN Pruritus  naloxone Injectable 0.1 milliGRAM(s) IV Push every 3 minutes PRN For ANY of the following changes in patient status:  A. RR LESS THAN 10 breaths per minute, B. Oxygen saturation LESS THAN 90%, C. Sedation score of 6  ondansetron Injectable 4 milliGRAM(s) IV Push every 6 hours PRN Nausea  oxyCODONE    IR 5 milliGRAM(s) Oral every 3 hours PRN Moderate - Severe Pain (4 - 10)      OBJECTIVE: Patient sitting in chair, CTx1    Sedation Score:	[ X] Alert	[ ] Drowsy 	[ ] Arousable	[ ] Asleep	[ ] Unresponsive    Side Effects:	[X ] None	[ ] Nausea	[ ] Vomiting	[ ] Pruritus  		[ ] Other:    Vital Signs Last 24 Hrs  T(C): 36.6 (11 Apr 2024 08:00), Max: 36.8 (11 Apr 2024 00:00)  T(F): 97.8 (11 Apr 2024 08:00), Max: 98.3 (11 Apr 2024 00:00)  HR: 68 (11 Apr 2024 10:31) (66 - 78)  BP: 119/70 (11 Apr 2024 09:00) (99/59 - 135/88)  BP(mean): 86 (11 Apr 2024 09:00) (66 - 104)  RR: 18 (11 Apr 2024 09:00) (10 - 23)  SpO2: 100% (11 Apr 2024 10:31) (96% - 100%)    Parameters below as of 11 Apr 2024 10:31  Patient On (Oxygen Delivery Method): room air        ASSESSMENT/ PLAN    Therapy to  be:	[ ] Continue   [ X] Discontinued   [X ] Change to prn Analgesics    Documentation and Verification of current medications:   [X] Done	[ ] Not done, not elligible    Comments: IV PCA discontinued. PRN Oral/IV opioids and/or Adjuvant non-opioid medication to be ordered at this point.    Progress Note written now but Patient was seen earlier.
   Subjective: patient seen and examined with thoracic surgery team  transferred to 8T from CTICU today   pt without acute complaints  pain controlled  pt denies chest pain or shortness of breath  using incentive spirometer  on bowel regimen, +flatus, +BM  ambulatory with assistance  waterseal trial w CXR, keep to ws  d/w attending on morning TEAMS report      Vital Signs:  Vital Signs Last 24 Hrs  T(C): 37 (04-12-24 @ 13:02), Max: 37.1 (04-12-24 @ 10:45)  T(F): 98.6 (04-12-24 @ 13:02), Max: 98.7 (04-12-24 @ 10:45)  HR: 68 (04-12-24 @ 13:02) (67 - 83)  BP: 108/66 (04-12-24 @ 13:02) (101/71 - 127/71)  RR: 18 (04-12-24 @ 13:02) (13 - 21)  SpO2: 96% (04-12-24 @ 13:02) (95% - 100%) on (O2)      PE  Telemetry/Alarms: SR  General: awake and alert NAD  Neurology: A&Ox3, nonfocal, BOUCHER x 4  Respiratory: CTA B/L  CV: RRR, S1S2, no murmurs, rubs or gallops  Abdominal: Soft, NT, ND +BS, Last BM  Extremities: No edema, + peripheral pulses  Incisions: c,d,i  Tubes: R CT ws drained 240cc/24h no air leak  Relevant labs, radiology and Medications reviewed                        11.9   8.29  )-----------( 127      ( 12 Apr 2024 03:15 )             34.9     04-12    137  |  105  |  22  ----------------------------<  103<H>  4.5   |  21<L>  |  0.95    Ca    8.6      12 Apr 2024 03:15  Phos  2.8     04-12  Mg     1.90     04-12      PT/INR - ( 12 Apr 2024 03:15 )   PT: 12.1 sec;   INR: 1.07 ratio         PTT - ( 12 Apr 2024 03:15 )  PTT:35.7 sec  MEDICATIONS  (STANDING):  acetaminophen     Tablet .. 650 milliGRAM(s) Oral every 6 hours  albuterol    0.083% 2.5 milliGRAM(s) Nebulizer every 6 hours  dornase parish Solution 2.5 milliGRAM(s) Inhalation daily  heparin   Injectable 5000 Unit(s) SubCutaneous every 8 hours  lidocaine   4% Patch 1 Patch Transdermal every 24 hours  polyethylene glycol 3350 17 Gram(s) Oral daily  senna 1 Tablet(s) Oral daily  sodium chloride 3%  Inhalation 4 milliLiter(s) Inhalation every 6 hours    MEDICATIONS  (PRN):  nalbuphine Injectable 2.5 milliGRAM(s) IV Push every 6 hours PRN Pruritus  naloxone Injectable 0.1 milliGRAM(s) IV Push every 3 minutes PRN For ANY of the following changes in patient status:  A. RR LESS THAN 10 breaths per minute, B. Oxygen saturation LESS THAN 90%, C. Sedation score of 6  ondansetron Injectable 4 milliGRAM(s) IV Push every 6 hours PRN Nausea  oxyCODONE    IR 5 milliGRAM(s) Oral every 3 hours PRN Moderate - Severe Pain (4 - 10)    Pertinent Physical Exam  I&O's Summary    11 Apr 2024 07:01  -  12 Apr 2024 07:00  --------------------------------------------------------  IN: 410 mL / OUT: 1240 mL / NET: -830 mL    12 Apr 2024 07:01  -  12 Apr 2024 15:31  --------------------------------------------------------  IN: 0 mL / OUT: 40 mL / NET: -40 mL              PAST MEDICAL & SURGICAL HISTORY:  Hypertension      GERD (gastroesophageal reflux disease)      Lung nodules      Hepatic artery aneurysm      Liver mass      Asthma      History of surgery on lower extremity      Hepatic artery aneurysm      ASSESSMENT  74y M HTN, asthma, GERD, hepatic artery aneurysm embolized by IR  s/p FB, RVATS, RLL lobectomy 4/10/2024. Postop routine.  Chest tube placed to waterseal today.    PLAN  Neuro: Pain management  Pulm: Encourage coughing, deep breathing and use of incentive spirometry. Wean off supplemental oxygen as able. Daily CXR.   Cardio: Monitor telemetry/alarms  GI: Tolerating diet. Continue stool softeners.  Renal: monitor urine output, supplement electrolytes as needed  Vasc: Heparin SC/SCDs for DVT prophylaxis  Heme: Stable H/H. .   ID: Off antibiotics. Stable.  Therapy: OOB/ambulate  Tubes: Monitor Chest tube output  Disposition: waterseal today, likely home tmrw  Discussed with Cardiothoracic Team at AM rounds.  
MA, CARONRAUL                     [id number]    HPI:  74 yr old male presents with hx of chronic cough with white phlegm for the past 2 yrs, was evaluated by thoracic surgery, was found to have right lung nodule and liver cyst and hepatic artery aneurysm; s/p hepatic artery aneurysm embolization on 2/29/24 Now scheduled for right video assisted thoracoscopy robotic assisted wedge resection of right lower lobe possible right lower lobectomy mediastinal lymph node dissection  (02 Apr 2024 13:19)      Procedure: RLL lobectomy    Issues:  Lung nodule  post op pain  HTN  GERD        PAST MEDICAL & SURGICAL HISTORY:  Hypertension      GERD (gastroesophageal reflux disease)      Lung nodules      Hepatic artery aneurysm      Liver mass      Asthma      History of surgery on lower extremity      Hepatic artery aneurysm                VITAL SIGNS:  Vital Signs Last 24 Hrs  T(C): 36.8 (10 Apr 2024 09:31), Max: 36.8 (10 Apr 2024 09:31)  T(F): 98.2 (10 Apr 2024 09:31), Max: 98.2 (10 Apr 2024 09:31)  HR: 67 (10 Apr 2024 09:31) (67 - 67)  BP: 130/73 (10 Apr 2024 09:31) (130/73 - 130/73)  BP(mean): --  RR: 18 (10 Apr 2024 09:31) (18 - 18)  SpO2: 98% (10 Apr 2024 09:31) (98% - 98%)        I/Os:   I&O's Detail      CAPILLARY BLOOD GLUCOSE          =======================MEDICATIONS===================  MEDICATIONS  (STANDING):  heparin   Injectable 5000 Unit(s) SubCutaneous every 8 hours  HYDROmorphone PCA (1 mG/mL) 30 milliLiter(s) PCA Continuous PCA Continuous  lactated ringers. 1000 milliLiter(s) (30 mL/Hr) IV Continuous <Continuous>  polyethylene glycol 3350 17 Gram(s) Oral daily  senna 1 Tablet(s) Oral daily    MEDICATIONS  (PRN):  HYDROmorphone PCA (1 mG/mL) Rescue Clinician Bolus 0.5 milliGRAM(s) IV Push every 15 minutes PRN for Pain Scale GREATER THAN 6  nalbuphine Injectable 2.5 milliGRAM(s) IV Push every 6 hours PRN Pruritus  naloxone Injectable 0.1 milliGRAM(s) IV Push every 3 minutes PRN For ANY of the following changes in patient status:  A. RR LESS THAN 10 breaths per minute, B. Oxygen saturation LESS THAN 90%, C. Sedation score of 6  ondansetron Injectable 4 milliGRAM(s) IV Push every 6 hours PRN Nausea    PHYSICAL EXAM============================  General:                         Awake, alert, not in any distress  Neuro:                            Moving all extremities to commands.   Respiratory:	Air entry fair and  bilateral conducted sounds                                           Effort even and unlabored.  CV:		Regular rate and rhythm. Normal S1/S2                                          Distal pulses present.  Abdomen:	                     Soft, non-distended. Bowel sounds present   Skin:		No rash.  Extremities:	Warm, no cyanosis or edema.  Palpable pulses        =============================NEUROLOGY============================  Pain control with PCA /  Tylenol IV     ==============================RESPIRATORY========================  Pt is on 2  L nasal canula   Comfortable, not in any distress.  Using incentive spirometry   Monitor chest tube output  Chest tube to suction 	  Continue bronchodilators, pulmonary toilet    ============================CARDIOVASCULAR======================  Continue hemodynamic monitoring.  Not on any pressors  HTN: restart home meds as indicated  =====================RENAL===================  Continue LR 30CC/hr    Monitor I/Os and electrolytes    ====================GASTROINTESTINAL===================  On clears, tolerating  GERD: Continue GI prophylaxis with  Protonix  Continue Zofran / Reglan for nausea - PRN	    ========================HEMATOLOGIC/ONCOLOGIC====================  Monitor chest tube output. No signs of active bleeding.   Follow CBC in AM    ============================INFECTIOUS DISEASE========================  Monitor for fever / leukocytosis.  All surgical incision / chest tube  sites look clean      Pt is on GI & DVT prophylaxis  OOB & ambulate       Pertinent clinical, laboratory, radiographic, hemodynamic, echocardiographic, respiratory data, microbiologic data and chart were reviewed and analyzed frequently throughout the course of the day and night  Patient seen, examined and plan discussed with CT Surgery / CTICU team during rounds.    Pt's status discussed with family at bedside, updated status        Hollis MEEKSP

## 2024-04-13 ENCOUNTER — NON-APPOINTMENT (OUTPATIENT)
Age: 74
End: 2024-04-13

## 2024-04-13 ENCOUNTER — TRANSCRIPTION ENCOUNTER (OUTPATIENT)
Age: 74
End: 2024-04-13

## 2024-04-13 VITALS — OXYGEN SATURATION: 96 %

## 2024-04-13 LAB
ANION GAP SERPL CALC-SCNC: 11 MMOL/L — SIGNIFICANT CHANGE UP (ref 7–14)
BUN SERPL-MCNC: 15 MG/DL — SIGNIFICANT CHANGE UP (ref 7–23)
CALCIUM SERPL-MCNC: 8.9 MG/DL — SIGNIFICANT CHANGE UP (ref 8.4–10.5)
CHLORIDE SERPL-SCNC: 106 MMOL/L — SIGNIFICANT CHANGE UP (ref 98–107)
CO2 SERPL-SCNC: 23 MMOL/L — SIGNIFICANT CHANGE UP (ref 22–31)
CREAT SERPL-MCNC: 0.73 MG/DL — SIGNIFICANT CHANGE UP (ref 0.5–1.3)
EGFR: 96 ML/MIN/1.73M2 — SIGNIFICANT CHANGE UP
GLUCOSE SERPL-MCNC: 116 MG/DL — HIGH (ref 70–99)
HCT VFR BLD CALC: 35.9 % — LOW (ref 39–50)
HGB BLD-MCNC: 11.9 G/DL — LOW (ref 13–17)
MCHC RBC-ENTMCNC: 29.5 PG — SIGNIFICANT CHANGE UP (ref 27–34)
MCHC RBC-ENTMCNC: 33.1 GM/DL — SIGNIFICANT CHANGE UP (ref 32–36)
MCV RBC AUTO: 89.1 FL — SIGNIFICANT CHANGE UP (ref 80–100)
NRBC # BLD: 0 /100 WBCS — SIGNIFICANT CHANGE UP (ref 0–0)
NRBC # FLD: 0 K/UL — SIGNIFICANT CHANGE UP (ref 0–0)
PLATELET # BLD AUTO: 141 K/UL — LOW (ref 150–400)
POTASSIUM SERPL-MCNC: 4.4 MMOL/L — SIGNIFICANT CHANGE UP (ref 3.5–5.3)
POTASSIUM SERPL-SCNC: 4.4 MMOL/L — SIGNIFICANT CHANGE UP (ref 3.5–5.3)
RBC # BLD: 4.03 M/UL — LOW (ref 4.2–5.8)
RBC # FLD: 14.4 % — SIGNIFICANT CHANGE UP (ref 10.3–14.5)
SODIUM SERPL-SCNC: 140 MMOL/L — SIGNIFICANT CHANGE UP (ref 135–145)
WBC # BLD: 5.94 K/UL — SIGNIFICANT CHANGE UP (ref 3.8–10.5)
WBC # FLD AUTO: 5.94 K/UL — SIGNIFICANT CHANGE UP (ref 3.8–10.5)

## 2024-04-13 PROCEDURE — 71045 X-RAY EXAM CHEST 1 VIEW: CPT | Mod: 26,76

## 2024-04-13 RX ORDER — ACETAMINOPHEN 500 MG
2 TABLET ORAL
Qty: 0 | Refills: 0 | DISCHARGE

## 2024-04-13 RX ORDER — POLYETHYLENE GLYCOL 3350 17 G/17G
17 POWDER, FOR SOLUTION ORAL
Qty: 0 | Refills: 0 | DISCHARGE
Start: 2024-04-13

## 2024-04-13 RX ORDER — SENNA PLUS 8.6 MG/1
1 TABLET ORAL
Qty: 0 | Refills: 0 | DISCHARGE
Start: 2024-04-13

## 2024-04-13 RX ORDER — OXYCODONE HYDROCHLORIDE 5 MG/1
1 TABLET ORAL
Qty: 30 | Refills: 0
Start: 2024-04-13 | End: 2024-04-17

## 2024-04-13 RX ADMIN — SENNA PLUS 1 TABLET(S): 8.6 TABLET ORAL at 12:22

## 2024-04-13 RX ADMIN — OXYCODONE HYDROCHLORIDE 5 MILLIGRAM(S): 5 TABLET ORAL at 11:07

## 2024-04-13 RX ADMIN — Medication 650 MILLIGRAM(S): at 06:29

## 2024-04-13 RX ADMIN — LIDOCAINE 1 PATCH: 4 CREAM TOPICAL at 10:38

## 2024-04-13 RX ADMIN — ALBUTEROL 2.5 MILLIGRAM(S): 90 AEROSOL, METERED ORAL at 03:59

## 2024-04-13 RX ADMIN — HEPARIN SODIUM 5000 UNIT(S): 5000 INJECTION INTRAVENOUS; SUBCUTANEOUS at 06:30

## 2024-04-13 RX ADMIN — SODIUM CHLORIDE 4 MILLILITER(S): 9 INJECTION INTRAMUSCULAR; INTRAVENOUS; SUBCUTANEOUS at 03:59

## 2024-04-13 RX ADMIN — ALBUTEROL 2.5 MILLIGRAM(S): 90 AEROSOL, METERED ORAL at 15:04

## 2024-04-13 RX ADMIN — POLYETHYLENE GLYCOL 3350 17 GRAM(S): 17 POWDER, FOR SOLUTION ORAL at 12:20

## 2024-04-13 RX ADMIN — Medication 650 MILLIGRAM(S): at 07:10

## 2024-04-13 RX ADMIN — SODIUM CHLORIDE 4 MILLILITER(S): 9 INJECTION INTRAMUSCULAR; INTRAVENOUS; SUBCUTANEOUS at 15:04

## 2024-04-13 RX ADMIN — ALBUTEROL 2.5 MILLIGRAM(S): 90 AEROSOL, METERED ORAL at 08:16

## 2024-04-13 RX ADMIN — HEPARIN SODIUM 5000 UNIT(S): 5000 INJECTION INTRAVENOUS; SUBCUTANEOUS at 14:07

## 2024-04-13 RX ADMIN — SODIUM CHLORIDE 4 MILLILITER(S): 9 INJECTION INTRAMUSCULAR; INTRAVENOUS; SUBCUTANEOUS at 08:17

## 2024-04-13 RX ADMIN — DORNASE ALFA 2.5 MILLIGRAM(S): 1 SOLUTION RESPIRATORY (INHALATION) at 08:18

## 2024-04-13 RX ADMIN — OXYCODONE HYDROCHLORIDE 5 MILLIGRAM(S): 5 TABLET ORAL at 12:02

## 2024-04-13 NOTE — DISCHARGE NOTE NURSING/CASE MANAGEMENT/SOCIAL WORK - NSDCFUADDAPPT_GEN_ALL_CORE_FT
Follow up with Dr. Vicente in 2 weeks. Call Thoracic Surgery office [phone] to schedule an appointment. Please, obtain a chest x ray 1-2 days prior to your appointment and bring a copy with you.    See your Primary care provider as needed.

## 2024-04-13 NOTE — DISCHARGE NOTE NURSING/CASE MANAGEMENT/SOCIAL WORK - PATIENT PORTAL LINK FT
You can access the FollowMyHealth Patient Portal offered by [facility] by registering at the following website: [email or web address]. By joining [facility]’s FollowMyHealth portal, you will also be able to view your health information using other applications (apps) compatible with our system.

## 2024-04-17 LAB — SURGICAL PATHOLOGY STUDY: SIGNIFICANT CHANGE UP

## 2024-05-02 ENCOUNTER — NON-APPOINTMENT (OUTPATIENT)
Age: 74
End: 2024-05-02

## 2024-05-02 ENCOUNTER — APPOINTMENT (OUTPATIENT)
Dept: THORACIC SURGERY | Facility: CLINIC | Age: 74
End: 2024-05-02
Payer: MEDICAID

## 2024-05-02 VITALS
HEART RATE: 89 BPM | TEMPERATURE: 97.6 F | DIASTOLIC BLOOD PRESSURE: 77 MMHG | WEIGHT: 160 LBS | BODY MASS INDEX: 25.06 KG/M2 | RESPIRATION RATE: 16 BRPM | SYSTOLIC BLOOD PRESSURE: 139 MMHG | OXYGEN SATURATION: 97 %

## 2024-05-02 DIAGNOSIS — C34.91 MALIGNANT NEOPLASM OF UNSPECIFIED PART OF RIGHT BRONCHUS OR LUNG: ICD-10-CM

## 2024-05-02 DIAGNOSIS — G89.18 OTHER ACUTE POSTPROCEDURAL PAIN: ICD-10-CM

## 2024-05-02 PROCEDURE — 99024 POSTOP FOLLOW-UP VISIT: CPT

## 2024-05-02 RX ORDER — TRAMADOL HYDROCHLORIDE 50 MG/1
50 TABLET, COATED ORAL
Qty: 15 | Refills: 0 | Status: ACTIVE | COMMUNITY
Start: 2024-05-02 | End: 1900-01-01

## 2024-05-02 NOTE — CONSULT LETTER
[FreeTextEntry2] : Elaina Bond MD (ref/PCP) [FreeTextEntry3] : Ruslan Vicente MD, MPH  System Director of Thoracic Surgery  Director of Comprehensive Lung and Foregut [place]  Professor Cardiovascular & Thoracic Surgery   [employer] [facility] [employer] at [facility]

## 2024-05-02 NOTE — ASSESSMENT
[FreeTextEntry1] : Mr. NIRU ESQUIVEL, 74 year old male, former smoker, w/ hx of HTN, asthma, who presented with persistent cough and mucus, found to have lung nodule and liver lesion.  MRI of Brain on 11/14/2023 at [facility]: - Mild chronic white matter microvascular ischemic disease. No acute infarct. - Unremarkable noncontrast appearance of the internal auditory canals and associated structures.  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 - No significant adenopathy or masses are present. - No evidence of aneurysm. - Mild centrilobular emphysema. - No evidence of pleural effusion or pleural-based mass.  1/22/24: Case discussed with Dr. Valencia, PET scan is helpful to assess liver mass, but patient would benefit with a CT Angio of the abdomen to assess the Lt hepatic artery/aneurysm. Will order CT Angio as discussed.  CT Angio Abdomen on 1/29/24: - RLL nodule measuring 2.0 x 1.6 cm with spiculated appearance (series 3 image 81), not significantly changed since 12/18/2023. - There is a 5 mm focus of enhancement within the nodule (series 3 image 80), concerning for associated aneurysm. - Mild cardiomegaly. - Right hepatic cyst and subcentimeter hypodense foci that are too small to characterize. Focus of hyperenhancement within the right lobe measuring 1.6 x 1.1 cm (series 2 image 25). Heterogeneous enhancement of the liver without additional discrete lesion identified. Correlate with the outside abdominal MRI exam. - There is a 7 mm focus of hyperenhancement within the pancreatic head (series 3 image 271), which appears to be in continuity with an arterial branch (series 601 images 45-46), possibly an intrapancreatic aneurysm. - A few subcentimeter hypodense foci in the left kidney that are too small to characterize. No hydronephrosis.  Now s/p left hepatic artery aneurysm embolization on 2/29/24 by Dr. Valencia. Hepatic angiogram demonstrating aneurysm arising from the left hepatic artery; embolization performed. 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  PFTs on 4/5/24: FVC 77%, FEV1 68%, DLCO 122%  Now s/p Rt VATS, R.A., extensive lysis of adhesion, wedge rxn of RLL, completion RLLobectomy, MLND on 4/10/24. 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 Pt c/o pain, but not on pain meds  I have reviewed the patient's medical records and diagnostic images at time of this office consultation and have made the following recommendation: 1. Path reviewed with pt, Stg IIIA disease, follow up with Oncologist Dr. Kwan for chemo 2. RTC in 6 mons with CT Chest w/o contrast 3. Rx Tramadol 50mg TID for 5 days   I, Dr. CISNEROS, ALLA CALDWELLVA [facility], personally performed the evaluation and management (E/M) services for this established patient who presents today with (a) new problem(s)/exacerbation of (an) existing condition(s).  That E/M includes conducting the examination, assessing all new/exacerbated conditions, and establishing a new plan of care.  Today, my ACP, Marisol Arriaga, BEBO-BC was here to observe my evaluation and management services for this new problem/exacerbated condition to be followed going forward.

## 2024-05-02 NOTE — REASON FOR VISIT
[de-identified] : Rt VATSTRICIA, extensive lysis of adhesion, wedge rxn of RLL, completion RLLobectomy, MLND [de-identified] : 4/10/24

## 2024-08-12 ENCOUNTER — NON-APPOINTMENT (OUTPATIENT)
Age: 74
End: 2024-08-12

## 2024-10-03 ENCOUNTER — APPOINTMENT (OUTPATIENT)
Dept: THORACIC SURGERY | Facility: CLINIC | Age: 74
End: 2024-10-03
Payer: MEDICARE

## 2024-10-03 VITALS
WEIGHT: 160 LBS | OXYGEN SATURATION: 97 % | HEART RATE: 86 BPM | SYSTOLIC BLOOD PRESSURE: 150 MMHG | RESPIRATION RATE: 16 BRPM | DIASTOLIC BLOOD PRESSURE: 80 MMHG | BODY MASS INDEX: 25.06 KG/M2

## 2024-10-03 DIAGNOSIS — C34.91 MALIGNANT NEOPLASM OF UNSPECIFIED PART OF RIGHT BRONCHUS OR LUNG: ICD-10-CM

## 2024-10-03 PROCEDURE — 99203 OFFICE O/P NEW LOW 30 MIN: CPT

## 2024-10-03 NOTE — HISTORY OF PRESENT ILLNESS
[FreeTextEntry1] : Mr. NIRU ESQUIVEL, 74 year old male, former smoker, w/ hx of HTN, asthma, who presented with persistent cough and mucus, found to have lung nodule and liver lesion.  MRI of Brain on 11/14/2023 at [facility]: - Mild chronic white matter microvascular ischemic disease. No acute infarct. - Unremarkable noncontrast appearance of the internal auditory canals and associated structures.  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 - No significant adenopathy or masses are present. - No evidence of aneurysm. - Mild centrilobular emphysema. - No evidence of pleural effusion or pleural-based mass.  1/22/24: Case discussed with Dr. Valencia, PET scan is helpful to assess liver mass, but patient would benefit with a CT Angio of the abdomen to assess the Lt hepatic artery/aneurysm. Will order CT Angio as discussed.  CT Angio Abdomen on 1/29/24: - RLL nodule measuring 2.0 x 1.6 cm with spiculated appearance (series 3 image 81), not significantly changed since 12/18/2023. - There is a 5 mm focus of enhancement within the nodule (series 3 image 80), concerning for associated aneurysm. - Mild cardiomegaly. - Right hepatic cyst and subcentimeter hypodense foci that are too small to characterize. Focus of hyperenhancement within the right lobe measuring 1.6 x 1.1 cm (series 2 image 25). Heterogeneous enhancement of the liver without additional discrete lesion identified. Correlate with the outside abdominal MRI exam. - There is a 7 mm focus of hyperenhancement within the pancreatic head (series 3 image 271), which appears to be in continuity with an arterial branch (series 601 images 45-46), possibly an intrapancreatic aneurysm. - A few subcentimeter hypodense foci in the left kidney that are too small to characterize. No hydronephrosis.  Now s/p left hepatic artery aneurysm embolization on 2/29/24 by Dr. Valencia. Hepatic angiogram demonstrating aneurysm arising from the left hepatic artery; embolization performed. 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  PFTs on 4/5/24: FVC 77%, FEV1 68%, DLCO 122%  Now s/p Rt VATS, R.A., extensive lysis of adhesion, wedge rxn of RLL, completion RLLobectomy, MLND on 4/10/24. 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 Pt c/o pain, but not on pain meds  Stg IIIA disease, follow up with Oncologist Dr. Kwan, s/p 7 cycles of chemo, now on immunotherapy, plan for 1 yr.   CT Chest on 9/19/24 at [facility]: - a few borderline enlarged mediastinal lymph nodes which have increased in number since previous examinations, measuring up to approximately 9 mm in the prevascular compartment. - Mild emphysematous changes are identified. - Patient is post right lower lobectomy. - There is also mild scarring and volume loss in right. No pulmonary nodules or masses are appreciated. - Trace right pleural effusion, decreased - There is extensive streak artifact in the right upper quadrant related to embolization material in the region of the shahram hepatis. The liver again appears to be nodular in contour suspicious for cirrhosis. No gross hepatic lesions identified on this noncontrast examination. Small hepatic cyst  Pt presents today for follow up.

## 2024-10-03 NOTE — CONSULT LETTER
[Dear  ___] : Dear  [unfilled], [Consult Letter:] : I had the pleasure of evaluating your patient, [unfilled]. [Please see my note below.] : Please see my note below. [Consult Closing:] : Thank you very much for allowing me to participate in the care of this patient.  If you have any questions, please do not hesitate to contact me. [Sincerely,] : Sincerely, [FreeTextEntry2] : Elaina Bond MD (ref/PCP) [FreeTextEntry3] : Ruslan Vicente MD, MPH  System Director of Thoracic Surgery  Director of Comprehensive Lung and Foregut [place]  Professor Cardiovascular & Thoracic Surgery   [facility] [employer] at [facility]

## 2024-10-03 NOTE — ASSESSMENT
[FreeTextEntry1] : Mr. NIRU ESQUIVEL, 74 year old male, former smoker, w/ hx of HTN, asthma, who presented with persistent cough and mucus, found to have lung nodule and liver lesion.  Now s/p Rt VATS, R.A., extensive lysis of adhesion, wedge rxn of RLL, completion RLLobectomy, MLND on 4/10/24. 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 - Mild emphysematous changes are identified. - Patient is post right lower lobectomy. - There is also mild scarring and volume loss in right. No pulmonary nodules or masses are appreciated. - Trace right pleural effusion, decreased - There is extensive streak artifact in the right upper quadrant related to embolization material in the region of the shahram hepatis. The liver again appears to be nodular in contour suspicious for cirrhosis. No gross hepatic lesions identified on this noncontrast examination. Small hepatic cyst  I have reviewed the patient's medical records and diagnostic images at time of this office consultation and have made the following recommendation: 1. CT reviewed with pt, stable scan. RTC in 6 mons with CT Chest w/o contrast 2. Will reach out to Dr. Valencia to see if pt needs continue follow up for gastroduodenal artery branch aneurysm   I, LALA Eubanks, personally performed the evaluation and management (E/M) services for this established patient who presents today with (a) new problem(s)/exacerbation of (an) existing condition(s).  That E/M includes conducting the examination, assessing all new/exacerbated conditions, and establishing a new plan of care.  Today, my ACP, Marisol Arriaga, MICHAELBC was here to observe my evaluation and management services for this new problem/exacerbated condition to be followed going forward.

## 2024-10-03 NOTE — CONSULT LETTER
[Dear  ___] : Dear  [unfilled], [Consult Letter:] : I had the pleasure of evaluating your patient, [unfilled]. [Please see my note below.] : Please see my note below. [Consult Closing:] : Thank you very much for allowing me to participate in the care of this patient.  If you have any questions, please do not hesitate to contact me. [Sincerely,] : Sincerely, [FreeTextEntry2] : Elaina Bond MD (ref/PCP) [FreeTextEntry3] : Ruslan Vicente MD, MPH  System Director of Thoracic Surgery  Director of Comprehensive Lung and Foregut [place]  Professor Cardiovascular & Thoracic Surgery   [facility] [employer] at [facility]

## 2024-10-03 NOTE — HISTORY OF PRESENT ILLNESS
[FreeTextEntry1] : Mr. NIRU ESQUIVEL, 74 year old male, former smoker, w/ hx of HTN, asthma, who presented with persistent cough and mucus, found to have lung nodule and liver lesion.  MRI of Brain on 11/14/2023 at [facility]: - Mild chronic white matter microvascular ischemic disease. No acute infarct. - Unremarkable noncontrast appearance of the internal auditory canals and associated structures.  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 - No significant adenopathy or masses are present. - No evidence of aneurysm. - Mild centrilobular emphysema. - No evidence of pleural effusion or pleural-based mass.  1/22/24: Case discussed with Dr. Valencia, PET scan is helpful to assess liver mass, but patient would benefit with a CT Angio of the abdomen to assess the Lt hepatic artery/aneurysm. Will order CT Angio as discussed.  CT Angio Abdomen on 1/29/24: - RLL nodule measuring 2.0 x 1.6 cm with spiculated appearance (series 3 image 81), not significantly changed since 12/18/2023. - There is a 5 mm focus of enhancement within the nodule (series 3 image 80), concerning for associated aneurysm. - Mild cardiomegaly. - Right hepatic cyst and subcentimeter hypodense foci that are too small to characterize. Focus of hyperenhancement within the right lobe measuring 1.6 x 1.1 cm (series 2 image 25). Heterogeneous enhancement of the liver without additional discrete lesion identified. Correlate with the outside abdominal MRI exam. - There is a 7 mm focus of hyperenhancement within the pancreatic head (series 3 image 271), which appears to be in continuity with an arterial branch (series 601 images 45-46), possibly an intrapancreatic aneurysm. - A few subcentimeter hypodense foci in the left kidney that are too small to characterize. No hydronephrosis.  Now s/p left hepatic artery aneurysm embolization on 2/29/24 by Dr. Valencia. Hepatic angiogram demonstrating aneurysm arising from the left hepatic artery; embolization performed. 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  PFTs on 4/5/24: FVC 77%, FEV1 68%, DLCO 122%  Now s/p Rt VATS, R.A., extensive lysis of adhesion, wedge rxn of RLL, completion RLLobectomy, MLND on 4/10/24. 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 Pt c/o pain, but not on pain meds  Stg IIIA disease, follow up with Oncologist Dr. Kwan, s/p 7 cycles of chemo, now on immunotherapy, plan for 1 yr.   CT Chest on 9/19/24 at [facility]: - a few borderline enlarged mediastinal lymph nodes which have increased in number since previous examinations, measuring up to approximately 9 mm in the prevascular compartment. - Mild emphysematous changes are identified. - Patient is post right lower lobectomy. - There is also mild scarring and volume loss in right. No pulmonary nodules or masses are appreciated. - Trace right pleural effusion, decreased - There is extensive streak artifact in the right upper quadrant related to embolization material in the region of the shahram hepatis. The liver again appears to be nodular in contour suspicious for cirrhosis. No gross hepatic lesions identified on this noncontrast examination. Small hepatic cyst  Pt presents today for follow up.

## 2024-10-11 ENCOUNTER — NON-APPOINTMENT (OUTPATIENT)
Age: 74
End: 2024-10-11

## 2025-01-24 NOTE — DISCHARGE NOTE NURSING/CASE MANAGEMENT/SOCIAL WORK - NSPROEXTENSIONSOFSELF_GEN_A_NUR
SARS-CoV-2 PCR positive on 1/20 . CT chest with with diffuse patchy groundglass opacities throughout both lungs which may represent pneumonitis versus pulmonary edema.  Has underlying COPD, chronic tobacco use  Stable on room air     Completed remdesivir x 3 days, additional management per protocol   Continue isolation precautions   none

## 2025-04-17 ENCOUNTER — APPOINTMENT (OUTPATIENT)
Dept: THORACIC SURGERY | Facility: CLINIC | Age: 75
End: 2025-04-17

## 2025-04-17 DIAGNOSIS — C34.91 MALIGNANT NEOPLASM OF UNSPECIFIED PART OF RIGHT BRONCHUS OR LUNG: ICD-10-CM

## 2025-04-17 DIAGNOSIS — R91.1 SOLITARY PULMONARY NODULE: ICD-10-CM

## 2025-05-15 ENCOUNTER — APPOINTMENT (OUTPATIENT)
Dept: THORACIC SURGERY | Facility: CLINIC | Age: 75
End: 2025-05-15
Payer: MEDICARE

## 2025-05-15 VITALS
SYSTOLIC BLOOD PRESSURE: 150 MMHG | DIASTOLIC BLOOD PRESSURE: 87 MMHG | RESPIRATION RATE: 18 BRPM | HEART RATE: 91 BPM | OXYGEN SATURATION: 96 % | WEIGHT: 172 LBS | BODY MASS INDEX: 26.94 KG/M2

## 2025-05-15 DIAGNOSIS — C34.91 MALIGNANT NEOPLASM OF UNSPECIFIED PART OF RIGHT BRONCHUS OR LUNG: ICD-10-CM

## 2025-05-15 DIAGNOSIS — R91.1 SOLITARY PULMONARY NODULE: ICD-10-CM

## 2025-05-15 PROCEDURE — 99213 OFFICE O/P EST LOW 20 MIN: CPT

## 2025-05-30 ENCOUNTER — APPOINTMENT (OUTPATIENT)
Dept: CARDIOLOGY | Facility: CLINIC | Age: 75
End: 2025-05-30

## 2025-05-30 ENCOUNTER — NON-APPOINTMENT (OUTPATIENT)
Age: 75
End: 2025-05-30

## 2025-05-30 VITALS
OXYGEN SATURATION: 97 % | RESPIRATION RATE: 16 BRPM | SYSTOLIC BLOOD PRESSURE: 105 MMHG | HEIGHT: 66.54 IN | BODY MASS INDEX: 25.73 KG/M2 | DIASTOLIC BLOOD PRESSURE: 65 MMHG | HEART RATE: 79 BPM | WEIGHT: 162 LBS

## 2025-05-30 DIAGNOSIS — R06.00 DYSPNEA, UNSPECIFIED: ICD-10-CM

## 2025-05-30 PROCEDURE — 93306 TTE W/DOPPLER COMPLETE: CPT

## 2025-06-06 ENCOUNTER — APPOINTMENT (OUTPATIENT)
Dept: CARDIOLOGY | Facility: CLINIC | Age: 75
End: 2025-06-06
Payer: MEDICARE

## 2025-06-06 VITALS
OXYGEN SATURATION: 96 % | SYSTOLIC BLOOD PRESSURE: 121 MMHG | RESPIRATION RATE: 16 BRPM | DIASTOLIC BLOOD PRESSURE: 77 MMHG | WEIGHT: 162 LBS | HEART RATE: 74 BPM | BODY MASS INDEX: 25.73 KG/M2

## 2025-06-06 PROCEDURE — 93000 ELECTROCARDIOGRAM COMPLETE: CPT

## 2025-06-06 PROCEDURE — 99204 OFFICE O/P NEW MOD 45 MIN: CPT | Mod: 25

## 2025-06-07 LAB
NT-PROBNP SERPL-MCNC: 62 PG/ML
TROPONIN-T, HIGH SENSITIVITY: 7 NG/L

## 2025-07-24 ENCOUNTER — APPOINTMENT (OUTPATIENT)
Dept: MRI IMAGING | Facility: CLINIC | Age: 75
End: 2025-07-24
Payer: MEDICARE

## 2025-07-24 PROCEDURE — 74185 MRA ABD W OR W/O CNTRST: CPT

## 2025-07-24 PROCEDURE — A9585: CPT | Mod: JZ

## 2025-09-05 ENCOUNTER — APPOINTMENT (OUTPATIENT)
Dept: CARDIOLOGY | Facility: CLINIC | Age: 75
End: 2025-09-05
Payer: MEDICARE

## 2025-09-05 ENCOUNTER — APPOINTMENT (OUTPATIENT)
Dept: CARDIOLOGY | Facility: CLINIC | Age: 75
End: 2025-09-05

## 2025-09-05 VITALS
OXYGEN SATURATION: 96 % | HEART RATE: 74 BPM | BODY MASS INDEX: 25.73 KG/M2 | RESPIRATION RATE: 16 BRPM | DIASTOLIC BLOOD PRESSURE: 76 MMHG | SYSTOLIC BLOOD PRESSURE: 113 MMHG | WEIGHT: 162 LBS

## 2025-09-05 PROCEDURE — 93306 TTE W/DOPPLER COMPLETE: CPT

## (undated) DEVICE — XI DRAPE ARM

## (undated) DEVICE — DRSG MASTISOL

## (undated) DEVICE — STAPLER COVIDIEN ENDO GIA STANDARD HANDLE

## (undated) DEVICE — TUBING STRYKEFLOW II SUCTION / IRRIGATOR

## (undated) DEVICE — TROCAR COVIDIEN VERSAONE BLADELESS FIXATION 12MM STANDARD

## (undated) DEVICE — XI ARM CLIP APPLIER MEDIUM-LARGE

## (undated) DEVICE — SYR LUER LOK 20CC

## (undated) DEVICE — BLADE SURGICAL #10 STAINLESS

## (undated) DEVICE — LUBRICANT INST ELECTROLUBE Z SOLUTION

## (undated) DEVICE — VENODYNE/SCD SLEEVE CALF MEDIUM

## (undated) DEVICE — CHEST DRAIN PLEUR-EVAC DRY/WET ADULT-PEDS SINGLE (QUICK)

## (undated) DEVICE — XI OBTURATOR OPTICAL BLADELESS 8MM

## (undated) DEVICE — DRSG CURITY GAUZE SPONGE 4 X 4" 12-PLY

## (undated) DEVICE — GOWN XL

## (undated) DEVICE — GRASPER LAPA 5MMX35CM

## (undated) DEVICE — XI ARM FORCEP FENESTRATED BIPOLAR 8MM

## (undated) DEVICE — TUBING SUCTION 20FT

## (undated) DEVICE — ENDOCATCH GENERAL 10MM (PURPLE)

## (undated) DEVICE — XI CORD BIPOLAR CAUTERY (BLUE)

## (undated) DEVICE — NDL HYPO SAFE 22G X 1.5" (BLACK)

## (undated) DEVICE — PACK ROBOTIC LIJ

## (undated) DEVICE — SUT PROLENE 0 30" CT-1

## (undated) DEVICE — MARKING PEN W RULER

## (undated) DEVICE — TROCAR COVIDIEN VERSAPORT BLADELESS OPTICAL 15MM STANDARD

## (undated) DEVICE — DRSG GAUZE PACKTNER ROLL

## (undated) DEVICE — TIP APPLCTR 27CM BIOGLUE W/SYR SPRDR

## (undated) DEVICE — ELCTR BOVIE TIP BLADE INSULATED 2.75" EDGE

## (undated) DEVICE — TROCAR COVIDIEN VERSASTEP PLUS 15MM STANDARD

## (undated) DEVICE — SUT SILK 2-0 30" SH

## (undated) DEVICE — SUT VICRYL 0 27" UR-6

## (undated) DEVICE — ELCTR BOVIE TIP BLADE INSULATED 6.5" EDGE

## (undated) DEVICE — XI DRAPE COLUMN

## (undated) DEVICE — XI SEAL UNIVERSIAL 5-12MM

## (undated) DEVICE — TUBING OLYMPUS INSUFFLATION

## (undated) DEVICE — D HELP - CLEARVIEW CLEARIFY SYSTEM

## (undated) DEVICE — LIJ-ESU BOVIE MACHINE - ROBOTIC 11469375: Type: DURABLE MEDICAL EQUIPMENT

## (undated) DEVICE — ELCTR BOVIE PENCIL SMOKE EVACUATION

## (undated) DEVICE — SOL IRR POUR NS 0.9% 500ML

## (undated) DEVICE — DRSG TEGADERM 4X4.75"

## (undated) DEVICE — ENDOCATCH GENERAL 15MM (PURPLE)